# Patient Record
Sex: FEMALE | Race: WHITE
[De-identification: names, ages, dates, MRNs, and addresses within clinical notes are randomized per-mention and may not be internally consistent; named-entity substitution may affect disease eponyms.]

---

## 2018-08-27 ENCOUNTER — RX RENEWAL (OUTPATIENT)
Age: 58
End: 2018-08-27

## 2018-09-29 ENCOUNTER — RECORD ABSTRACTING (OUTPATIENT)
Age: 58
End: 2018-09-29

## 2018-09-29 DIAGNOSIS — Z80.3 FAMILY HISTORY OF MALIGNANT NEOPLASM OF BREAST: ICD-10-CM

## 2018-09-29 DIAGNOSIS — Z82.49 FAMILY HISTORY OF ISCHEMIC HEART DISEASE AND OTHER DISEASES OF THE CIRCULATORY SYSTEM: ICD-10-CM

## 2018-11-14 ENCOUNTER — NON-APPOINTMENT (OUTPATIENT)
Age: 58
End: 2018-11-14

## 2018-11-14 ENCOUNTER — LABORATORY RESULT (OUTPATIENT)
Age: 58
End: 2018-11-14

## 2018-11-14 ENCOUNTER — APPOINTMENT (OUTPATIENT)
Dept: PULMONOLOGY | Facility: CLINIC | Age: 58
End: 2018-11-14
Payer: COMMERCIAL

## 2018-11-14 VITALS
DIASTOLIC BLOOD PRESSURE: 75 MMHG | SYSTOLIC BLOOD PRESSURE: 112 MMHG | OXYGEN SATURATION: 99 % | BODY MASS INDEX: 22.58 KG/M2 | HEART RATE: 64 BPM | RESPIRATION RATE: 18 BRPM | HEIGHT: 60 IN | TEMPERATURE: 97.7 F | WEIGHT: 115 LBS

## 2018-11-14 DIAGNOSIS — M89.9 DISORDER OF BONE, UNSPECIFIED: ICD-10-CM

## 2018-11-14 LAB
ALBUMIN: 10
BILIRUB UR QL STRIP: NEGATIVE
CLARITY UR: CLEAR
COLLECTION METHOD: NORMAL
CREATININE: 50
GLUCOSE UR-MCNC: NEGATIVE
HCG UR QL: 0.2 EU/DL
HGB UR QL STRIP.AUTO: NORMAL
KETONES UR-MCNC: NEGATIVE
LEUKOCYTE ESTERASE UR QL STRIP: NEGATIVE
MICROALBUMIN/CREAT UR TEST STR-RTO: <30
NITRITE UR QL STRIP: NEGATIVE
PH UR STRIP: 5.5
PROT UR STRIP-MCNC: NEGATIVE
SP GR UR STRIP: <=1.005

## 2018-11-14 PROCEDURE — 71046 X-RAY EXAM CHEST 2 VIEWS: CPT

## 2018-11-14 PROCEDURE — 82044 UR ALBUMIN SEMIQUANTITATIVE: CPT | Mod: QW

## 2018-11-14 PROCEDURE — 99396 PREV VISIT EST AGE 40-64: CPT | Mod: 25

## 2018-11-14 PROCEDURE — 77080 DXA BONE DENSITY AXIAL: CPT

## 2018-11-14 PROCEDURE — 81003 URINALYSIS AUTO W/O SCOPE: CPT | Mod: QW

## 2018-11-14 PROCEDURE — 36415 COLL VENOUS BLD VENIPUNCTURE: CPT

## 2018-11-14 PROCEDURE — 93000 ELECTROCARDIOGRAM COMPLETE: CPT

## 2018-11-15 LAB
25(OH)D3 SERPL-MCNC: 17.3 NG/ML
ALBUMIN SERPL ELPH-MCNC: 5 G/DL
ALP BLD-CCNC: 49 U/L
ALT SERPL-CCNC: 14 U/L
ANION GAP SERPL CALC-SCNC: 11 MMOL/L
AST SERPL-CCNC: 10 U/L
BASOPHILS # BLD AUTO: 0.02 K/UL
BASOPHILS NFR BLD AUTO: 0.4 %
BILIRUB SERPL-MCNC: 0.6 MG/DL
BUN SERPL-MCNC: 12 MG/DL
CALCIUM SERPL-MCNC: 9.8 MG/DL
CHLORIDE SERPL-SCNC: 103 MMOL/L
CHOLEST SERPL-MCNC: 204 MG/DL
CHOLEST/HDLC SERPL: 2.8 RATIO
CO2 SERPL-SCNC: 28 MMOL/L
CREAT SERPL-MCNC: 0.65 MG/DL
EOSINOPHIL # BLD AUTO: 0.12 K/UL
EOSINOPHIL NFR BLD AUTO: 2.4 %
GLUCOSE SERPL-MCNC: 97 MG/DL
HBA1C MFR BLD HPLC: 5.4 %
HCT VFR BLD CALC: 41.2 %
HCV AB SER QL: NONREACTIVE
HCV S/CO RATIO: 0.2 S/CO
HDLC SERPL-MCNC: 72 MG/DL
HGB BLD-MCNC: 13.2 G/DL
IMM GRANULOCYTES NFR BLD AUTO: 0 %
LDLC SERPL CALC-MCNC: 118 MG/DL
LYMPHOCYTES # BLD AUTO: 1.39 K/UL
LYMPHOCYTES NFR BLD AUTO: 27.5 %
MAN DIFF?: NORMAL
MCHC RBC-ENTMCNC: 31 PG
MCHC RBC-ENTMCNC: 32 GM/DL
MCV RBC AUTO: 96.7 FL
MONOCYTES # BLD AUTO: 0.33 K/UL
MONOCYTES NFR BLD AUTO: 6.5 %
NEUTROPHILS # BLD AUTO: 3.19 K/UL
NEUTROPHILS NFR BLD AUTO: 63.2 %
PLATELET # BLD AUTO: 253 K/UL
POTASSIUM SERPL-SCNC: 4.9 MMOL/L
PROT SERPL-MCNC: 7.3 G/DL
RBC # BLD: 4.26 M/UL
RBC # FLD: 13.1 %
SODIUM SERPL-SCNC: 142 MMOL/L
T3 SERPL-MCNC: 86 NG/DL
T3RU NFR SERPL: 1.01 INDEX
T4 FREE SERPL-MCNC: 1.6 NG/DL
T4 SERPL-MCNC: 8 UG/DL
TRIGL SERPL-MCNC: 72 MG/DL
TSH SERPL-ACNC: 0.34 UIU/ML
WBC # FLD AUTO: 5.05 K/UL

## 2018-11-22 ENCOUNTER — RX RENEWAL (OUTPATIENT)
Age: 58
End: 2018-11-22

## 2019-08-06 ENCOUNTER — APPOINTMENT (OUTPATIENT)
Dept: GASTROENTEROLOGY | Facility: CLINIC | Age: 59
End: 2019-08-06
Payer: COMMERCIAL

## 2019-08-06 VITALS
HEIGHT: 60 IN | BODY MASS INDEX: 22.38 KG/M2 | HEART RATE: 68 BPM | TEMPERATURE: 98.7 F | DIASTOLIC BLOOD PRESSURE: 70 MMHG | SYSTOLIC BLOOD PRESSURE: 110 MMHG | WEIGHT: 114 LBS | OXYGEN SATURATION: 99 %

## 2019-08-06 DIAGNOSIS — Z86.39 PERSONAL HISTORY OF OTHER ENDOCRINE, NUTRITIONAL AND METABOLIC DISEASE: ICD-10-CM

## 2019-08-06 DIAGNOSIS — Z84.89 FAMILY HISTORY OF OTHER SPECIFIED CONDITIONS: ICD-10-CM

## 2019-08-06 DIAGNOSIS — Z78.9 OTHER SPECIFIED HEALTH STATUS: ICD-10-CM

## 2019-08-06 PROCEDURE — 99243 OFF/OP CNSLTJ NEW/EST LOW 30: CPT

## 2019-08-06 NOTE — HISTORY OF PRESENT ILLNESS
[Heartburn] : denies heartburn [Nausea] : denies nausea [Vomiting] : denies vomiting [Diarrhea] : denies diarrhea [Constipation] : denies constipation [Yellow Skin Or Eyes (Jaundice)] : denies jaundice [Abdominal Pain] : denies abdominal pain [Abdominal Swelling] : denies abdominal swelling [Rectal Pain] : denies rectal pain [_________] : Performed [unfilled] [Wt Gain ___ Lbs] : no recent weight gain [Wt Loss ___ Lbs] : no recent weight loss [GERD] : no gastroesophageal reflux disease [Hiatus Hernia] : no hiatus hernia [Peptic Ulcer Disease] : no peptic ulcer disease [Pancreatitis] : no pancreatitis [Cholelithiasis] : no cholelithiasis [Kidney Stone] : no kidney stone [Inflammatory Bowel Disease] : no inflammatory bowel disease [Irritable Bowel Syndrome] : no irritable bowel syndrome [Diverticulitis] : no diverticulitis [Alcohol Abuse] : no alcohol abuse [Malignancy] : no malignancy [Abdominal Surgery] : no abdominal surgery [Appendectomy] : no appendectomy [Cholecystectomy] : no cholecystectomy [de-identified] : 59 year old woman presents for a screening colonoscopy. Her last colonoscopy on 10/1/13 was normal. She denies rectal bleeding, melena or hematemesis. She is on thyroid medication. She ha No family history  of colon cancer or polyps. [de-identified] : negative

## 2019-08-06 NOTE — CONSULT LETTER
[Dear  ___] : Dear  [unfilled], [Consult Letter:] : I had the pleasure of evaluating your patient, [unfilled]. [Please see my note below.] : Please see my note below. [Consult Closing:] : Thank you very much for allowing me to participate in the care of this patient.  If you have any questions, please do not hesitate to contact me. [Sincerely,] : Sincerely, [FreeTextEntry3] : Luís Bray MD, FACG\par

## 2019-08-06 NOTE — PHYSICAL EXAM
[General Appearance - Alert] : alert [General Appearance - In No Acute Distress] : in no acute distress [Sclera] : the sclera and conjunctiva were normal [PERRL With Normal Accommodation] : pupils were equal in size, round, and reactive to light [Extraocular Movements] : extraocular movements were intact [Outer Ear] : the ears and nose were normal in appearance [Oropharynx] : the oropharynx was normal [Neck Cervical Mass (___cm)] : no neck mass was observed [Neck Appearance] : the appearance of the neck was normal [Jugular Venous Distention Increased] : there was no jugular-venous distention [Thyroid Diffuse Enlargement] : the thyroid was not enlarged [Thyroid Nodule] : there were no palpable thyroid nodules [Auscultation Breath Sounds / Voice Sounds] : lungs were clear to auscultation bilaterally [Heart Rate And Rhythm] : heart rate was normal and rhythm regular [Heart Sounds] : normal S1 and S2 [Heart Sounds Gallop] : no gallops [Murmurs] : no murmurs [Heart Sounds Pericardial Friction Rub] : no pericardial rub [Bowel Sounds] : normal bowel sounds [Abdomen Tenderness] : non-tender [Abdomen Soft] : soft [] : no hepato-splenomegaly [Abdomen Mass (___ Cm)] : no abdominal mass palpated [Cervical Lymph Nodes Enlarged Posterior Bilaterally] : posterior cervical [Cervical Lymph Nodes Enlarged Anterior Bilaterally] : anterior cervical [Supraclavicular Lymph Nodes Enlarged Bilaterally] : supraclavicular [No CVA Tenderness] : no ~M costovertebral angle tenderness [No Spinal Tenderness] : no spinal tenderness [Abnormal Walk] : normal gait [Nail Clubbing] : no clubbing  or cyanosis of the fingernails [Musculoskeletal - Swelling] : no joint swelling seen [Motor Tone] : muscle strength and tone were normal [Sensation] : the sensory exam was normal to light touch and pinprick [Deep Tendon Reflexes (DTR)] : deep tendon reflexes were 2+ and symmetric [No Focal Deficits] : no focal deficits [Oriented To Time, Place, And Person] : oriented to person, place, and time [Impaired Insight] : insight and judgment were intact [Affect] : the affect was normal

## 2019-09-19 ENCOUNTER — RX RENEWAL (OUTPATIENT)
Age: 59
End: 2019-09-19

## 2019-11-06 ENCOUNTER — TRANSCRIPTION ENCOUNTER (OUTPATIENT)
Age: 59
End: 2019-11-06

## 2019-11-15 ENCOUNTER — APPOINTMENT (OUTPATIENT)
Dept: PULMONOLOGY | Facility: CLINIC | Age: 59
End: 2019-11-15

## 2020-02-03 ENCOUNTER — APPOINTMENT (OUTPATIENT)
Dept: PULMONOLOGY | Facility: CLINIC | Age: 60
End: 2020-02-03

## 2020-02-27 ENCOUNTER — TRANSCRIPTION ENCOUNTER (OUTPATIENT)
Age: 60
End: 2020-02-27

## 2020-02-27 ENCOUNTER — LABORATORY RESULT (OUTPATIENT)
Age: 60
End: 2020-02-27

## 2020-02-27 ENCOUNTER — NON-APPOINTMENT (OUTPATIENT)
Age: 60
End: 2020-02-27

## 2020-02-27 ENCOUNTER — APPOINTMENT (OUTPATIENT)
Dept: PULMONOLOGY | Facility: CLINIC | Age: 60
End: 2020-02-27
Payer: COMMERCIAL

## 2020-02-27 VITALS
SYSTOLIC BLOOD PRESSURE: 110 MMHG | HEIGHT: 60 IN | BODY MASS INDEX: 22.38 KG/M2 | DIASTOLIC BLOOD PRESSURE: 73 MMHG | WEIGHT: 114 LBS | HEART RATE: 70 BPM | OXYGEN SATURATION: 96 % | TEMPERATURE: 97.7 F

## 2020-02-27 DIAGNOSIS — M85.80 OTHER SPECIFIED DISORDERS OF BONE DENSITY AND STRUCTURE, UNSPECIFIED SITE: ICD-10-CM

## 2020-02-27 DIAGNOSIS — Z00.00 ENCOUNTER FOR GENERAL ADULT MEDICAL EXAMINATION W/OUT ABNORMAL FINDINGS: ICD-10-CM

## 2020-02-27 LAB
ALBUMIN: 10
BASOPHILS # BLD AUTO: 0.04 K/UL
BASOPHILS NFR BLD AUTO: 1.2 %
BILIRUB UR QL STRIP: NEGATIVE
CLARITY UR: CLEAR
COLLECTION METHOD: NORMAL
CREATININE: 10
EOSINOPHIL # BLD AUTO: 0.11 K/UL
EOSINOPHIL NFR BLD AUTO: 3.2 %
GLUCOSE UR-MCNC: NEGATIVE
HCG UR QL: 0.2 EU/DL
HCT VFR BLD CALC: 38.8 %
HGB BLD-MCNC: 13.2 G/DL
HGB UR QL STRIP.AUTO: NEGATIVE
IMM GRANULOCYTES NFR BLD AUTO: 0.3 %
KETONES UR-MCNC: NEGATIVE
LEUKOCYTE ESTERASE UR QL STRIP: NORMAL
LYMPHOCYTES # BLD AUTO: 1.17 K/UL
LYMPHOCYTES NFR BLD AUTO: 34.3 %
MAN DIFF?: NORMAL
MCHC RBC-ENTMCNC: 32.8 PG
MCHC RBC-ENTMCNC: 34 GM/DL
MCV RBC AUTO: 96.3 FL
MICROALBUMIN/CREAT UR TEST STR-RTO: <30
MONOCYTES # BLD AUTO: 0.29 K/UL
MONOCYTES NFR BLD AUTO: 8.5 %
NEUTROPHILS # BLD AUTO: 1.79 K/UL
NEUTROPHILS NFR BLD AUTO: 52.5 %
NITRITE UR QL STRIP: NEGATIVE
PH UR STRIP: 5.5
PLATELET # BLD AUTO: 245 K/UL
PROT UR STRIP-MCNC: NEGATIVE
RBC # BLD: 4.03 M/UL
RBC # FLD: 11.9 %
SP GR UR STRIP: 1.01
WBC # FLD AUTO: 3.41 K/UL

## 2020-02-27 PROCEDURE — 36415 COLL VENOUS BLD VENIPUNCTURE: CPT

## 2020-02-27 PROCEDURE — 82044 UR ALBUMIN SEMIQUANTITATIVE: CPT | Mod: QW

## 2020-02-27 PROCEDURE — 93000 ELECTROCARDIOGRAM COMPLETE: CPT

## 2020-02-27 PROCEDURE — 99396 PREV VISIT EST AGE 40-64: CPT | Mod: 25

## 2020-02-27 PROCEDURE — 81003 URINALYSIS AUTO W/O SCOPE: CPT | Mod: QW

## 2020-02-28 LAB
25(OH)D3 SERPL-MCNC: 12.4 NG/ML
ALBUMIN SERPL ELPH-MCNC: 5 G/DL
ALP BLD-CCNC: 47 U/L
ALT SERPL-CCNC: 11 U/L
ANION GAP SERPL CALC-SCNC: 15 MMOL/L
AST SERPL-CCNC: 12 U/L
BILIRUB SERPL-MCNC: 0.7 MG/DL
BUN SERPL-MCNC: 12 MG/DL
CALCIUM SERPL-MCNC: 9.7 MG/DL
CHLORIDE SERPL-SCNC: 101 MMOL/L
CHOLEST SERPL-MCNC: 185 MG/DL
CHOLEST/HDLC SERPL: 2.6 RATIO
CO2 SERPL-SCNC: 23 MMOL/L
CREAT SERPL-MCNC: 0.72 MG/DL
ESTIMATED AVERAGE GLUCOSE: 105 MG/DL
GLUCOSE SERPL-MCNC: 83 MG/DL
HBA1C MFR BLD HPLC: 5.3 %
HCV AB SER QL: NONREACTIVE
HCV S/CO RATIO: 0.26 S/CO
HDLC SERPL-MCNC: 70 MG/DL
LDLC SERPL CALC-MCNC: 105 MG/DL
POTASSIUM SERPL-SCNC: 4.3 MMOL/L
PROT SERPL-MCNC: 7.1 G/DL
SODIUM SERPL-SCNC: 139 MMOL/L
T4 FREE SERPL-MCNC: 1.9 NG/DL
T4 SERPL-MCNC: 9.4 UG/DL
TRIGL SERPL-MCNC: 47 MG/DL
TSH SERPL-ACNC: 0.28 UIU/ML

## 2020-02-28 RX ORDER — ERGOCALCIFEROL 1.25 MG/1
1.25 MG CAPSULE, LIQUID FILLED ORAL
Qty: 8 | Refills: 1 | Status: ACTIVE | COMMUNITY
Start: 2020-02-28 | End: 1900-01-01

## 2020-02-28 NOTE — PROCEDURE
[FreeTextEntry1] : UA  noted\par  Blood Draw\par \par EKG 2/27/2020\par NSR\par \par Chest x-ray PA lateral projection November 14, 2018\par Mild upper lobe scar noted\par Cardiac size normal\par No pleural effusions parenchymal infiltrates dominant pulmonary nodules\par Mediastinum hilum normal\par \par DEXA Osteopenia f/u Nov 2020\par \par Data review February 28, 2020\par Hemoglobin A1c 5.3%\par Hepatitis C titer negative\par Lipid profile cholesterol 185 HDL 70 \par Triglycerides 47\par TSH 0.28\par Free T4 1.9\par Vitamin D 12.4\par Serum electrolytes normal\par Liver function testing normal\par CBC White blood count 3.41 hemoglobin 13.2 hematocrit 38.8\par Neutrophil 1.79\par Platelet count 245,000\par

## 2020-02-28 NOTE — ADDENDUM
[FreeTextEntry1] : LAB review\par CBC within normal limits\par Thyroid function test WNL\par HCV neg\par Electrolytes sodium potassium serum calcium\par Normal\par Glucose normal liver function tests normal\par Vit 17.3 advised to take vitamin D 2000 units daily HGBA1c WNL\par Cholesterol 204 HDL 72  triglycerides 72 ratio 2.8

## 2020-02-28 NOTE — HISTORY OF PRESENT ILLNESS
[Improved] : have improved [None] : The patient is not currently on any medications [FreeTextEntry1] : s/p GYN\par Flu shot up to date\par Colonoscopy Oct 2013 last GI Aug visit 2019 Osteopenia\par Mammogram Jan 2019 with GYN

## 2020-02-28 NOTE — PHYSICAL EXAM
[General Appearance - Well Developed] : well developed [Normal Appearance] : normal appearance [Well Groomed] : well groomed [General Appearance - Well Nourished] : well nourished [No Deformities] : no deformities [General Appearance - In No Acute Distress] : no acute distress [Normal Conjunctiva] : the conjunctiva exhibited no abnormalities [Eyelids - No Xanthelasma] : the eyelids demonstrated no xanthelasmas [Normal Oropharynx] : normal oropharynx [I] : I [Neck Appearance] : the appearance of the neck was normal [Neck Cervical Mass (___cm)] : no neck mass was observed [Jugular Venous Distention Increased] : there was no jugular-venous distention [Thyroid Diffuse Enlargement] : the thyroid was not enlarged [Thyroid Nodule] : there were no palpable thyroid nodules [Heart Rate And Rhythm] : heart rate and rhythm were normal [Heart Sounds] : normal S1 and S2 [Murmurs] : no murmurs present [Arterial Pulses Normal] : the arterial pulses were normal [Edema] : no peripheral edema present [Veins - Varicosity Changes] : no varicosital changes were noted in the lower extremities [Respiration, Rhythm And Depth] : normal respiratory rhythm and effort [Exaggerated Use Of Accessory Muscles For Inspiration] : no accessory muscle use [Auscultation Breath Sounds / Voice Sounds] : lungs were clear to auscultation bilaterally [Chest Palpation] : palpation of the chest revealed no abnormalities [Lungs Percussion] : the lungs were normal to percussion [Bowel Sounds] : normal bowel sounds [Abdomen Tenderness] : non-tender [Abdomen Soft] : soft [Abdomen Mass (___ Cm)] : no abdominal mass palpated [Abnormal Walk] : normal gait [Gait - Sufficient For Exercise Testing] : the gait was sufficient for exercise testing [Nail Clubbing] : no clubbing of the fingernails [Cyanosis, Localized] : no localized cyanosis [Petechial Hemorrhages (___cm)] : no petechial hemorrhages [Skin Color & Pigmentation] : normal skin color and pigmentation [] : no rash [No Venous Stasis] : no venous stasis [Skin Lesions] : no skin lesions [No Skin Ulcers] : no skin ulcer [No Xanthoma] : no  xanthoma was observed [Cranial Nerves] : cranial nerves 2-12 were intact [Sensation] : the sensory exam was normal to light touch and pinprick [Deep Tendon Reflexes (DTR)] : deep tendon reflexes were 2+ and symmetric [No Focal Deficits] : no focal deficits [Motor Exam] : the motor exam was normal [Oriented To Time, Place, And Person] : oriented to person, place, and time [Impaired Insight] : insight and judgment were intact [Affect] : the affect was normal [Mood] : the mood was normal [FreeTextEntry1] : No adenopathy

## 2020-02-28 NOTE — DISCUSSION/SUMMARY
[FreeTextEntry1] : Well Visit\par Blood work review\par Vit D  insufficiency\par  Osteopenia\par hypothyroidism-on Synthroid\par Borderline mild loctawkxxa-gpkcbw-fc CBC 1 month-subtle viral etiology\par  Complete labs \par weight bearing exercise\par  Ca++/ Vit d\par  As per GYN Mammogram Jan  f/u readvised\par  f/u GI timing colonoscopy post GI Aug 2019\par urine  culture \par Advised vitamin D supplementation with 50,000units 1 tablet weekly x8 with a refill\par Recheck CBC\par Office follow-up 3 months to repeat vitamin D

## 2020-02-29 DIAGNOSIS — Z87.440 PERSONAL HISTORY OF URINARY (TRACT) INFECTIONS: ICD-10-CM

## 2020-02-29 LAB — BACTERIA UR CULT: ABNORMAL

## 2020-03-11 ENCOUNTER — RX RENEWAL (OUTPATIENT)
Age: 60
End: 2020-03-11

## 2020-03-25 ENCOUNTER — TRANSCRIPTION ENCOUNTER (OUTPATIENT)
Age: 60
End: 2020-03-25

## 2020-05-26 ENCOUNTER — TRANSCRIPTION ENCOUNTER (OUTPATIENT)
Age: 60
End: 2020-05-26

## 2020-05-28 ENCOUNTER — APPOINTMENT (OUTPATIENT)
Dept: PULMONOLOGY | Facility: CLINIC | Age: 60
End: 2020-05-28
Payer: COMMERCIAL

## 2020-05-28 VITALS
OXYGEN SATURATION: 100 % | SYSTOLIC BLOOD PRESSURE: 129 MMHG | TEMPERATURE: 97.7 F | HEART RATE: 78 BPM | DIASTOLIC BLOOD PRESSURE: 76 MMHG

## 2020-05-28 LAB
BILIRUB UR QL STRIP: NEGATIVE
CLARITY UR: CLEAR
COLLECTION METHOD: NORMAL
GLUCOSE UR-MCNC: NEGATIVE
HCG UR QL: 0.2 EU/DL
HGB UR QL STRIP.AUTO: NEGATIVE
KETONES UR-MCNC: NEGATIVE
LEUKOCYTE ESTERASE UR QL STRIP: NORMAL
NITRITE UR QL STRIP: NEGATIVE
PH UR STRIP: 6.5
PROT UR STRIP-MCNC: NEGATIVE
SP GR UR STRIP: 1.01

## 2020-05-28 PROCEDURE — 99214 OFFICE O/P EST MOD 30 MIN: CPT | Mod: 25

## 2020-05-28 PROCEDURE — 81003 URINALYSIS AUTO W/O SCOPE: CPT | Mod: QW

## 2020-05-28 PROCEDURE — 36415 COLL VENOUS BLD VENIPUNCTURE: CPT

## 2020-05-28 RX ORDER — CEFACLOR 500 MG/1
500 CAPSULE ORAL
Qty: 10 | Refills: 0 | Status: DISCONTINUED | COMMUNITY
Start: 2020-02-29 | End: 2020-05-28

## 2020-05-29 LAB
25(OH)D3 SERPL-MCNC: 34.2 NG/ML
BASOPHILS # BLD AUTO: 0.03 K/UL
BASOPHILS NFR BLD AUTO: 0.5 %
EOSINOPHIL # BLD AUTO: 0.08 K/UL
EOSINOPHIL NFR BLD AUTO: 1.4 %
HCT VFR BLD CALC: 42 %
HGB BLD-MCNC: 13.5 G/DL
IMM GRANULOCYTES NFR BLD AUTO: 0.3 %
LYMPHOCYTES # BLD AUTO: 1.67 K/UL
LYMPHOCYTES NFR BLD AUTO: 29.2 %
MAN DIFF?: NORMAL
MCHC RBC-ENTMCNC: 31.7 PG
MCHC RBC-ENTMCNC: 32.1 GM/DL
MCV RBC AUTO: 98.6 FL
MONOCYTES # BLD AUTO: 0.39 K/UL
MONOCYTES NFR BLD AUTO: 6.8 %
NEUTROPHILS # BLD AUTO: 3.53 K/UL
NEUTROPHILS NFR BLD AUTO: 61.8 %
PLATELET # BLD AUTO: 262 K/UL
RBC # BLD: 4.26 M/UL
RBC # FLD: 12.4 %
T4 FREE SERPL-MCNC: 1.8 NG/DL
T4 SERPL-MCNC: 9.4 UG/DL
TSH SERPL-ACNC: 0.22 UIU/ML
WBC # FLD AUTO: 5.72 K/UL

## 2020-05-30 LAB
SARS-COV-2 IGG SERPL IA-ACNC: 0.2 INDEX
SARS-COV-2 IGG SERPL QL IA: NEGATIVE

## 2020-05-30 NOTE — PHYSICAL EXAM
[Normal Appearance] : normal appearance [General Appearance - Well Developed] : well developed [No Deformities] : no deformities [General Appearance - Well Nourished] : well nourished [Well Groomed] : well groomed [Normal Conjunctiva] : the conjunctiva exhibited no abnormalities [Eyelids - No Xanthelasma] : the eyelids demonstrated no xanthelasmas [General Appearance - In No Acute Distress] : no acute distress [Normal Oropharynx] : normal oropharynx [I] : I [Neck Appearance] : the appearance of the neck was normal [Jugular Venous Distention Increased] : there was no jugular-venous distention [Neck Cervical Mass (___cm)] : no neck mass was observed [Thyroid Diffuse Enlargement] : the thyroid was not enlarged [Thyroid Nodule] : there were no palpable thyroid nodules [Heart Rate And Rhythm] : heart rate and rhythm were normal [Murmurs] : no murmurs present [Arterial Pulses Normal] : the arterial pulses were normal [Heart Sounds] : normal S1 and S2 [Veins - Varicosity Changes] : no varicosital changes were noted in the lower extremities [Edema] : no peripheral edema present [Auscultation Breath Sounds / Voice Sounds] : lungs were clear to auscultation bilaterally [Exaggerated Use Of Accessory Muscles For Inspiration] : no accessory muscle use [Respiration, Rhythm And Depth] : normal respiratory rhythm and effort [Lungs Percussion] : the lungs were normal to percussion [Chest Palpation] : palpation of the chest revealed no abnormalities [Abdomen Soft] : soft [Abdomen Tenderness] : non-tender [Bowel Sounds] : normal bowel sounds [Abdomen Mass (___ Cm)] : no abdominal mass palpated [Gait - Sufficient For Exercise Testing] : the gait was sufficient for exercise testing [Abnormal Walk] : normal gait [Cyanosis, Localized] : no localized cyanosis [Nail Clubbing] : no clubbing of the fingernails [Petechial Hemorrhages (___cm)] : no petechial hemorrhages [Skin Color & Pigmentation] : normal skin color and pigmentation [] : no rash [No Venous Stasis] : no venous stasis [Skin Lesions] : no skin lesions [No Skin Ulcers] : no skin ulcer [Cranial Nerves] : cranial nerves 2-12 were intact [No Xanthoma] : no  xanthoma was observed [Sensation] : the sensory exam was normal to light touch and pinprick [Deep Tendon Reflexes (DTR)] : deep tendon reflexes were 2+ and symmetric [No Focal Deficits] : no focal deficits [Motor Exam] : the motor exam was normal [Impaired Insight] : insight and judgment were intact [Oriented To Time, Place, And Person] : oriented to person, place, and time [Affect] : the affect was normal [Mood] : the mood was normal [FreeTextEntry1] : non icteric

## 2020-05-30 NOTE — PROCEDURE
[FreeTextEntry1] : UA  noted-small leukocytes without nitrates-await culture\par  Blood Draw\par \par EKG 2/27/2020\par NSR\par \par Chest x-ray PA lateral projection November 14, 2018\par Mild upper lobe scar noted\par Cardiac size normal\par No pleural effusions parenchymal infiltrates dominant pulmonary nodules\par Mediastinum hilum normal\par \par DEXA Osteopenia f/u Nov 2020\par \par Data review May 29 2020\par CBC normal\par White blood count normalized 5.72 hemoglobin 13.5 hematocrit 42 platelets 262,000\par Vitamin D 34.2\par \par Data review February 28, 2020\par Hemoglobin A1c 5.3%\par Hepatitis C titer negative\par Lipid profile cholesterol 185 HDL 70 \par Triglycerides 47\par TSH 0.28\par Free T4 1.9\par Vitamin D 12.4\par Serum electrolytes normal\par Liver function testing normal\par CBC White blood count 3.41 hemoglobin 13.2 hematocrit 38.8\par Neutrophil 1.79\par Platelet count 245,000\par

## 2020-05-30 NOTE — DISCUSSION/SUMMARY
[FreeTextEntry1] : COVID ab Negative\par Vit D  insufficiency\par  Osteopenia\par hypothyroidism-on Synthroid- Change to 6 days per week and recheck next  visit\par Borderline mild iyuppyllaj-iubgeo-ej CBC 1 month-subtle viral etiology\par  Complete labs \par weight bearing exercise\par  Ca++/ Vit d\par  As per GYN Mammogram Jan  f/u readvised\par  f/u GI timing colonoscopy post GI Aug 2019\par urine  culture f/u\par Advised vitamin D supplementation with 50,000units 1 tablet weekly x8 with a refill\par Recheck CBC\par Nov 2020 DEXA

## 2020-05-30 NOTE — HISTORY OF PRESENT ILLNESS
[Improved] : have improved [None] : The patient is not currently on any medications [FreeTextEntry1] : s/p GYN\par Colonoscopy Oct 2013 last GI Aug visit 2019\par  Osteopenia\par Mammogram Jan 2019 with GYN per patient all up  to  date\par Returns to the office for follow-up to recheck CBC vitamin D thyroid function testing and urine analysis with culture\par Prior urine analysis with culture February 27, 2020 strep group B

## 2020-06-01 LAB — BACTERIA UR CULT: ABNORMAL

## 2020-09-09 ENCOUNTER — RX RENEWAL (OUTPATIENT)
Age: 60
End: 2020-09-09

## 2020-09-21 ENCOUNTER — TRANSCRIPTION ENCOUNTER (OUTPATIENT)
Age: 60
End: 2020-09-21

## 2020-09-22 ENCOUNTER — TRANSCRIPTION ENCOUNTER (OUTPATIENT)
Age: 60
End: 2020-09-22

## 2020-12-23 PROBLEM — Z87.440 HISTORY OF URINARY TRACT INFECTION: Status: RESOLVED | Noted: 2020-02-29 | Resolved: 2020-12-23

## 2021-02-10 ENCOUNTER — TRANSCRIPTION ENCOUNTER (OUTPATIENT)
Age: 61
End: 2021-02-10

## 2021-03-12 ENCOUNTER — APPOINTMENT (OUTPATIENT)
Dept: PULMONOLOGY | Facility: CLINIC | Age: 61
End: 2021-03-12
Payer: COMMERCIAL

## 2021-03-12 ENCOUNTER — LABORATORY RESULT (OUTPATIENT)
Age: 61
End: 2021-03-12

## 2021-03-12 VITALS
TEMPERATURE: 98.2 F | DIASTOLIC BLOOD PRESSURE: 82 MMHG | SYSTOLIC BLOOD PRESSURE: 142 MMHG | OXYGEN SATURATION: 98 % | HEART RATE: 74 BPM

## 2021-03-12 VITALS — DIASTOLIC BLOOD PRESSURE: 78 MMHG | SYSTOLIC BLOOD PRESSURE: 125 MMHG

## 2021-03-12 DIAGNOSIS — R82.90 UNSPECIFIED ABNORMAL FINDINGS IN URINE: ICD-10-CM

## 2021-03-12 DIAGNOSIS — E55.9 VITAMIN D DEFICIENCY, UNSPECIFIED: ICD-10-CM

## 2021-03-12 PROCEDURE — 99072 ADDL SUPL MATRL&STAF TM PHE: CPT

## 2021-03-12 PROCEDURE — 99214 OFFICE O/P EST MOD 30 MIN: CPT | Mod: 25

## 2021-03-12 PROCEDURE — 36415 COLL VENOUS BLD VENIPUNCTURE: CPT

## 2021-03-12 NOTE — PHYSICAL EXAM
[General Appearance - Well Developed] : well developed [Normal Appearance] : normal appearance [Well Groomed] : well groomed [General Appearance - Well Nourished] : well nourished [No Deformities] : no deformities [General Appearance - In No Acute Distress] : no acute distress [Normal Conjunctiva] : the conjunctiva exhibited no abnormalities [Eyelids - No Xanthelasma] : the eyelids demonstrated no xanthelasmas [Normal Oropharynx] : normal oropharynx [I] : I [Neck Appearance] : the appearance of the neck was normal [Neck Cervical Mass (___cm)] : no neck mass was observed [Jugular Venous Distention Increased] : there was no jugular-venous distention [Thyroid Diffuse Enlargement] : the thyroid was not enlarged [Thyroid Nodule] : there were no palpable thyroid nodules [Heart Rate And Rhythm] : heart rate and rhythm were normal [Heart Sounds] : normal S1 and S2 [Murmurs] : no murmurs present [Arterial Pulses Normal] : the arterial pulses were normal [Edema] : no peripheral edema present [Veins - Varicosity Changes] : no varicosital changes were noted in the lower extremities [Respiration, Rhythm And Depth] : normal respiratory rhythm and effort [Exaggerated Use Of Accessory Muscles For Inspiration] : no accessory muscle use [Auscultation Breath Sounds / Voice Sounds] : lungs were clear to auscultation bilaterally [Chest Palpation] : palpation of the chest revealed no abnormalities [Lungs Percussion] : the lungs were normal to percussion [Bowel Sounds] : normal bowel sounds [Abdomen Soft] : soft [Abdomen Tenderness] : non-tender [Abdomen Mass (___ Cm)] : no abdominal mass palpated [Abnormal Walk] : normal gait [Gait - Sufficient For Exercise Testing] : the gait was sufficient for exercise testing [Nail Clubbing] : no clubbing of the fingernails [Cyanosis, Localized] : no localized cyanosis [Petechial Hemorrhages (___cm)] : no petechial hemorrhages [Skin Color & Pigmentation] : normal skin color and pigmentation [] : no rash [No Venous Stasis] : no venous stasis [Skin Lesions] : no skin lesions [No Skin Ulcers] : no skin ulcer [No Xanthoma] : no  xanthoma was observed [Cranial Nerves] : cranial nerves 2-12 were intact [Deep Tendon Reflexes (DTR)] : deep tendon reflexes were 2+ and symmetric [Sensation] : the sensory exam was normal to light touch and pinprick [Motor Exam] : the motor exam was normal [No Focal Deficits] : no focal deficits [Oriented To Time, Place, And Person] : oriented to person, place, and time [Impaired Insight] : insight and judgment were intact [Affect] : the affect was normal [Mood] : the mood was normal [FreeTextEntry1] : No adenopathy

## 2021-03-12 NOTE — DISCUSSION/SUMMARY
[FreeTextEntry1] : COVID ab Negative\par Vit D  insufficiency\par  Osteopenia\par hypothyroidism-on Synthroid- Change to 6 days per week and recheck next  visit\par Borderline mild hakyikyfof-hkoifm-in CBC 1 month-subtle viral etiology\par  Complete labs \par weight bearing exercise\par  Ca++/ Vit d\par  As per GYN Mammogram Jan  f/u readvised\par  f/u GI timing colonoscopy post GI Aug 2019\par urine  culture f/u pending\par Advised vitamin D supplementation with 50,000units 1 tablet weekly x8 with a refill\par Recheck CBC\par Nov 2020 DEXA- f/u  next  visit

## 2021-03-12 NOTE — HISTORY OF PRESENT ILLNESS
[Improved] : have improved [None] : The patient is not currently on any medications [FreeTextEntry1] : Hypothyroid\par   Urine pos Strep not txed\par \par s/p GYN\par Colonoscopy Oct 2013 last GI Aug visit 2019\par  Osteopenia\par Mammogram Jan 2019 with GYN per patient all up  to  date\par Returns to the office for follow-up to recheck CBC vitamin D thyroid function testing and urine analysis with culture\par Prior urine analysis with culture February 27, 2020 strep group B Patient Representative

## 2021-03-13 ENCOUNTER — NON-APPOINTMENT (OUTPATIENT)
Age: 61
End: 2021-03-13

## 2021-03-13 LAB
T3 SERPL-MCNC: 83 NG/DL
T4 SERPL-MCNC: 9 UG/DL
TSH SERPL-ACNC: 2.84 UIU/ML

## 2021-03-14 LAB — BACTERIA UR CULT: NORMAL

## 2021-03-15 ENCOUNTER — TRANSCRIPTION ENCOUNTER (OUTPATIENT)
Age: 61
End: 2021-03-15

## 2021-03-25 ENCOUNTER — RX RENEWAL (OUTPATIENT)
Age: 61
End: 2021-03-25

## 2021-06-16 ENCOUNTER — TRANSCRIPTION ENCOUNTER (OUTPATIENT)
Age: 61
End: 2021-06-16

## 2021-06-17 ENCOUNTER — TRANSCRIPTION ENCOUNTER (OUTPATIENT)
Age: 61
End: 2021-06-17

## 2021-06-21 ENCOUNTER — APPOINTMENT (OUTPATIENT)
Dept: PULMONOLOGY | Facility: CLINIC | Age: 61
End: 2021-06-21
Payer: COMMERCIAL

## 2021-06-21 ENCOUNTER — NON-APPOINTMENT (OUTPATIENT)
Age: 61
End: 2021-06-21

## 2021-06-21 VITALS
TEMPERATURE: 97.8 F | HEART RATE: 82 BPM | OXYGEN SATURATION: 99 % | SYSTOLIC BLOOD PRESSURE: 125 MMHG | DIASTOLIC BLOOD PRESSURE: 81 MMHG

## 2021-06-21 DIAGNOSIS — Z11.59 ENCOUNTER FOR SCREENING FOR OTHER VIRAL DISEASES: ICD-10-CM

## 2021-06-21 LAB
BASOPHILS # BLD AUTO: 0.04 K/UL
BASOPHILS NFR BLD AUTO: 0.7 %
EOSINOPHIL # BLD AUTO: 0.1 K/UL
EOSINOPHIL NFR BLD AUTO: 1.7 %
HCT VFR BLD CALC: 40.4 %
HGB BLD-MCNC: 12.7 G/DL
IMM GRANULOCYTES NFR BLD AUTO: 0.2 %
LYMPHOCYTES # BLD AUTO: 1.06 K/UL
LYMPHOCYTES NFR BLD AUTO: 18.4 %
MAN DIFF?: NORMAL
MCHC RBC-ENTMCNC: 31.4 GM/DL
MCHC RBC-ENTMCNC: 32 PG
MCV RBC AUTO: 101.8 FL
MONOCYTES # BLD AUTO: 0.52 K/UL
MONOCYTES NFR BLD AUTO: 9 %
NEUTROPHILS # BLD AUTO: 4.02 K/UL
NEUTROPHILS NFR BLD AUTO: 70 %
PLATELET # BLD AUTO: 240 K/UL
RBC # BLD: 3.97 M/UL
RBC # FLD: 12.4 %
WBC # FLD AUTO: 5.75 K/UL

## 2021-06-21 PROCEDURE — 99213 OFFICE O/P EST LOW 20 MIN: CPT | Mod: 25

## 2021-06-21 PROCEDURE — 99072 ADDL SUPL MATRL&STAF TM PHE: CPT

## 2021-06-21 PROCEDURE — 36415 COLL VENOUS BLD VENIPUNCTURE: CPT

## 2021-06-21 NOTE — HISTORY OF PRESENT ILLNESS
[Improved] : have improved [None] : The patient is not currently on any medications [FreeTextEntry1] : sinus sxs\par  clear mucous\par   no  fever\par \par Hypothyroid\par \par s/p GYN\par Colonoscopy Oct 2013 last GI Aug visit 2019\par  Osteopenia\par Mammogram Jan 2019 with GYN per patient all up  to  date\par Returns to the office for follow-up to recheck CBC vitamin D thyroid function testing and urine analysis with culture\par Prior urine analysis with culture February 27, 2020 strep group B

## 2021-06-21 NOTE — PROCEDURE
[FreeTextEntry1] : \par  Blood Draw\par \par EKG 2/27/2020\par NSR\par \par Chest x-ray PA lateral projection November 14, 2018\par Mild upper lobe scar noted\par Cardiac size normal\par No pleural effusions parenchymal infiltrates dominant pulmonary nodules\par Mediastinum hilum normal\par \par DEXA Osteopenia f/u Nov 2020\par \par Data review May 29 2020\par CBC normal\par White blood count normalized 5.72 hemoglobin 13.5 hematocrit 42 platelets 262,000\par Vitamin D 34.2\par \par Data review February 28, 2020\par Hemoglobin A1c 5.3%\par Hepatitis C titer negative\par Lipid profile cholesterol 185 HDL 70 \par Triglycerides 47\par TSH 0.28\par Free T4 1.9\par Vitamin D 12.4\par Serum electrolytes normal\par Liver function testing normal\par CBC White blood count 3.41 hemoglobin 13.2 hematocrit 38.8\par Neutrophil 1.79\par Platelet count 245,000\par

## 2021-06-21 NOTE — DISCUSSION/SUMMARY
[FreeTextEntry1] : Sinusitis\par Rx flonase\par       Z APK\par COVID ab Negative\par Vit D  insufficiency\par  Osteopenia\par hypothyroidism-on Synthroid- Change to 6 days per week and recheck next  visit\par Borderline mild fgnfwentju-opbzkm-bc CBC 1 month-subtle viral etiology\par  Complete labs \par weight bearing exercise\par  Ca++/ Vit d\par  As per GYN Mammogram Jan  f/u readvised\par  f/u GI timing colonoscopy post GI Aug 2019\par urine  culture f/u pending\par Advised vitamin D supplementation with 50,000units 1 tablet weekly x8 with a refill\par Recheck CBC\par Nov 2020 DEXA- f/u  next  visit\par LAbs

## 2021-06-22 LAB
RAPID RVP RESULT: DETECTED
RV+EV RNA SPEC QL NAA+PROBE: DETECTED
SARS-COV-2 RNA PNL RESP NAA+PROBE: NOT DETECTED
TSH SERPL-ACNC: 3.43 UIU/ML

## 2021-08-16 ENCOUNTER — TRANSCRIPTION ENCOUNTER (OUTPATIENT)
Age: 61
End: 2021-08-16

## 2021-10-01 ENCOUNTER — APPOINTMENT (OUTPATIENT)
Dept: PULMONOLOGY | Facility: CLINIC | Age: 61
End: 2021-10-01

## 2021-10-18 ENCOUNTER — RX RENEWAL (OUTPATIENT)
Age: 61
End: 2021-10-18

## 2021-11-30 ENCOUNTER — TRANSCRIPTION ENCOUNTER (OUTPATIENT)
Age: 61
End: 2021-11-30

## 2021-12-17 DIAGNOSIS — Z23 ENCOUNTER FOR IMMUNIZATION: ICD-10-CM

## 2021-12-18 ENCOUNTER — TRANSCRIPTION ENCOUNTER (OUTPATIENT)
Age: 61
End: 2021-12-18

## 2021-12-20 ENCOUNTER — APPOINTMENT (OUTPATIENT)
Dept: PULMONOLOGY | Facility: CLINIC | Age: 61
End: 2021-12-20

## 2022-02-13 ENCOUNTER — TRANSCRIPTION ENCOUNTER (OUTPATIENT)
Age: 62
End: 2022-02-13

## 2022-02-24 ENCOUNTER — APPOINTMENT (OUTPATIENT)
Dept: PULMONOLOGY | Facility: CLINIC | Age: 62
End: 2022-02-24
Payer: COMMERCIAL

## 2022-02-24 ENCOUNTER — TRANSCRIPTION ENCOUNTER (OUTPATIENT)
Age: 62
End: 2022-02-24

## 2022-02-24 DIAGNOSIS — U07.1 COVID-19: ICD-10-CM

## 2022-02-24 PROCEDURE — 99213 OFFICE O/P EST LOW 20 MIN: CPT | Mod: 95

## 2022-02-24 NOTE — PROCEDURE
[FreeTextEntry1] : \par \par EKG 2/27/2020\par NSR\par \par Chest x-ray PA lateral projection November 14, 2018\par Mild upper lobe scar noted\par Cardiac size normal\par No pleural effusions parenchymal infiltrates dominant pulmonary nodules\par Mediastinum hilum normal\par \par DEXA Osteopenia f/u Nov 2020\par \par Data review May 29 2020\par CBC normal\par White blood count normalized 5.72 hemoglobin 13.5 hematocrit 42 platelets 262,000\par Vitamin D 34.2\par \par Data review February 28, 2020\par Hemoglobin A1c 5.3%\par Hepatitis C titer negative\par Lipid profile cholesterol 185 HDL 70 \par Triglycerides 47\par TSH 0.28\par Free T4 1.9\par Vitamin D 12.4\par Serum electrolytes normal\par Liver function testing normal\par CBC White blood count 3.41 hemoglobin 13.2 hematocrit 38.8\par Neutrophil 1.79\par Platelet count 245,000\par

## 2022-02-24 NOTE — REVIEW OF SYSTEMS
[Headache] : headache [Thyroid Problem] : thyroid problem [Negative] : Psychiatric [TextBox_144] : hypothyroid

## 2022-02-24 NOTE — PHYSICAL EXAM
[No Acute Distress] : no acute distress [No Resp Distress] : no resp distress [TextBox_68] : Able to talk in full sentences

## 2022-02-24 NOTE — HISTORY OF PRESENT ILLNESS
[Home] : at home, [unfilled] , at the time of the visit. [Medical Office: ([facility])___] : at the medical office located in  [Verbal consent obtained from patient] : the patient, [unfilled] [Never] : never [TextBox_4] : C/O pos HA\par exposure son with COVID 19\par no other sxs\par No URI sxs\par  COVID PFIZER x3 with last dose PFIZER

## 2022-02-24 NOTE — DISCUSSION/SUMMARY
[FreeTextEntry1] : COVID 19 Infection 2/24/22\par Vit D  insufficiency\par  Osteopenia\par hypothyroidism-on Synthroid- Change to 6 days per week and recheck next  visit\par Borderline mild wljxsmcxlf-byeisk-ix CBC 1 month-subtle viral etiology\par  Complete labs \par weight bearing exercise\par  Ca++/ Vit d\par  As per GYN Mammogram Oz  f/u readvised\par  f/u GI timing colonoscopy post GI Aug 2019\par urine  culture f/u pending\par Advised vitamin D supplementation with 50,000units 1 tablet weekly x8 with a refill\par Recheck CBC\par Nov 2020 DEXA- f/u  next  visit\par \par COVID BUNDLE STAT\par No strong indication for monoclonal antibody protocol\par Patient will check pulse oximetry to maintain O2 saturation greater than 93 otherwise seek immediate medical care and notify office\par Protocol aspirin 81 mg x 4 to 6 weeks\par Pepcid 40 mg times minimum 2 weeks\par Patient remains on vitamin D\par Covid track\par Notify if any deterioration in status\par Tylenol for headache

## 2022-02-25 ENCOUNTER — NON-APPOINTMENT (OUTPATIENT)
Age: 62
End: 2022-02-25

## 2022-02-26 ENCOUNTER — NON-APPOINTMENT (OUTPATIENT)
Age: 62
End: 2022-02-26

## 2022-02-26 LAB
ALBUMIN SERPL ELPH-MCNC: 4.8 G/DL
ALP BLD-CCNC: 51 U/L
ALT SERPL-CCNC: 14 U/L
ANION GAP SERPL CALC-SCNC: 15 MMOL/L
AST SERPL-CCNC: 14 U/L
BASOPHILS # BLD AUTO: 0.02 K/UL
BASOPHILS NFR BLD AUTO: 0.4 %
BILIRUB SERPL-MCNC: 0.3 MG/DL
BUN SERPL-MCNC: 9 MG/DL
CALCIUM SERPL-MCNC: 9.3 MG/DL
CHLORIDE SERPL-SCNC: 103 MMOL/L
CO2 SERPL-SCNC: 23 MMOL/L
CREAT SERPL-MCNC: 0.74 MG/DL
CRP SERPL-MCNC: 9 MG/L
DEPRECATED D DIMER PPP IA-ACNC: <150 NG/ML DDU
EOSINOPHIL # BLD AUTO: 0.05 K/UL
EOSINOPHIL NFR BLD AUTO: 1.1 %
FERRITIN SERPL-MCNC: 118 NG/ML
GLUCOSE SERPL-MCNC: 97 MG/DL
HCT VFR BLD CALC: 39 %
HGB BLD-MCNC: 12.6 G/DL
IMM GRANULOCYTES NFR BLD AUTO: 0.2 %
LYMPHOCYTES # BLD AUTO: 0.78 K/UL
LYMPHOCYTES NFR BLD AUTO: 16.6 %
MAN DIFF?: NORMAL
MCHC RBC-ENTMCNC: 31.4 PG
MCHC RBC-ENTMCNC: 32.3 GM/DL
MCV RBC AUTO: 97.3 FL
MONOCYTES # BLD AUTO: 0.48 K/UL
MONOCYTES NFR BLD AUTO: 10.2 %
NEUTROPHILS # BLD AUTO: 3.35 K/UL
NEUTROPHILS NFR BLD AUTO: 71.5 %
PLATELET # BLD AUTO: 247 K/UL
POTASSIUM SERPL-SCNC: 4.8 MMOL/L
PROCALCITONIN SERPL-MCNC: 0.05 NG/ML
PROT SERPL-MCNC: 7.3 G/DL
RBC # BLD: 4.01 M/UL
RBC # FLD: 12.2 %
SODIUM SERPL-SCNC: 141 MMOL/L
WBC # FLD AUTO: 4.69 K/UL

## 2022-04-11 PROBLEM — Z11.59 SCREENING FOR VIRAL DISEASE: Status: ACTIVE | Noted: 2020-05-28

## 2022-05-19 ENCOUNTER — TRANSCRIPTION ENCOUNTER (OUTPATIENT)
Age: 62
End: 2022-05-19

## 2022-05-19 ENCOUNTER — RX RENEWAL (OUTPATIENT)
Age: 62
End: 2022-05-19

## 2022-08-06 ENCOUNTER — EMERGENCY (EMERGENCY)
Facility: HOSPITAL | Age: 62
LOS: 1 days | Discharge: HOME | End: 2022-08-06
Attending: STUDENT IN AN ORGANIZED HEALTH CARE EDUCATION/TRAINING PROGRAM | Admitting: EMERGENCY MEDICINE
Payer: COMMERCIAL

## 2022-08-06 VITALS
WEIGHT: 104.94 LBS | HEIGHT: 60 IN | OXYGEN SATURATION: 100 % | HEART RATE: 53 BPM | DIASTOLIC BLOOD PRESSURE: 55 MMHG | TEMPERATURE: 97 F | RESPIRATION RATE: 17 BRPM | SYSTOLIC BLOOD PRESSURE: 92 MMHG

## 2022-08-06 VITALS
DIASTOLIC BLOOD PRESSURE: 62 MMHG | TEMPERATURE: 98 F | HEART RATE: 75 BPM | SYSTOLIC BLOOD PRESSURE: 109 MMHG | OXYGEN SATURATION: 100 % | RESPIRATION RATE: 16 BRPM

## 2022-08-06 LAB
ALBUMIN SERPL ELPH-MCNC: 3.9 G/DL — SIGNIFICANT CHANGE UP (ref 3.3–5)
ALP SERPL-CCNC: 48 U/L — SIGNIFICANT CHANGE UP (ref 40–120)
ALT FLD-CCNC: 18 U/L — SIGNIFICANT CHANGE UP (ref 10–45)
ANION GAP SERPL CALC-SCNC: 12 MMOL/L — SIGNIFICANT CHANGE UP (ref 5–17)
AST SERPL-CCNC: 19 U/L — SIGNIFICANT CHANGE UP (ref 10–40)
BASOPHILS # BLD AUTO: 0.05 K/UL — SIGNIFICANT CHANGE UP (ref 0–0.2)
BASOPHILS NFR BLD AUTO: 0.7 % — SIGNIFICANT CHANGE UP (ref 0–2)
BILIRUB SERPL-MCNC: 0.6 MG/DL — SIGNIFICANT CHANGE UP (ref 0.2–1.2)
BUN SERPL-MCNC: 10 MG/DL — SIGNIFICANT CHANGE UP (ref 7–23)
CALCIUM SERPL-MCNC: 8.3 MG/DL — LOW (ref 8.4–10.5)
CHLORIDE SERPL-SCNC: 106 MMOL/L — SIGNIFICANT CHANGE UP (ref 96–108)
CO2 SERPL-SCNC: 22 MMOL/L — SIGNIFICANT CHANGE UP (ref 22–31)
CREAT SERPL-MCNC: 0.7 MG/DL — SIGNIFICANT CHANGE UP (ref 0.5–1.3)
EGFR: 98 ML/MIN/1.73M2 — SIGNIFICANT CHANGE UP
EOSINOPHIL # BLD AUTO: 0.08 K/UL — SIGNIFICANT CHANGE UP (ref 0–0.5)
EOSINOPHIL NFR BLD AUTO: 1.2 % — SIGNIFICANT CHANGE UP (ref 0–6)
ETHANOL SERPL-MCNC: 162 MG/DL — HIGH (ref 0–3)
GLUCOSE BLDC GLUCOMTR-MCNC: 101 MG/DL — HIGH (ref 70–99)
GLUCOSE SERPL-MCNC: 90 MG/DL — SIGNIFICANT CHANGE UP (ref 70–99)
HCT VFR BLD CALC: 38.5 % — SIGNIFICANT CHANGE UP (ref 34.5–45)
HGB BLD-MCNC: 13 G/DL — SIGNIFICANT CHANGE UP (ref 11.5–15.5)
IMM GRANULOCYTES NFR BLD AUTO: 0.3 % — SIGNIFICANT CHANGE UP (ref 0–1.5)
LYMPHOCYTES # BLD AUTO: 1.5 K/UL — SIGNIFICANT CHANGE UP (ref 1–3.3)
LYMPHOCYTES # BLD AUTO: 22.4 % — SIGNIFICANT CHANGE UP (ref 13–44)
MCHC RBC-ENTMCNC: 32.7 PG — SIGNIFICANT CHANGE UP (ref 27–34)
MCHC RBC-ENTMCNC: 33.8 GM/DL — SIGNIFICANT CHANGE UP (ref 32–36)
MCV RBC AUTO: 96.7 FL — SIGNIFICANT CHANGE UP (ref 80–100)
MONOCYTES # BLD AUTO: 0.37 K/UL — SIGNIFICANT CHANGE UP (ref 0–0.9)
MONOCYTES NFR BLD AUTO: 5.5 % — SIGNIFICANT CHANGE UP (ref 2–14)
NEUTROPHILS # BLD AUTO: 4.67 K/UL — SIGNIFICANT CHANGE UP (ref 1.8–7.4)
NEUTROPHILS NFR BLD AUTO: 69.9 % — SIGNIFICANT CHANGE UP (ref 43–77)
NRBC # BLD: 0 /100 WBCS — SIGNIFICANT CHANGE UP (ref 0–0)
PLATELET # BLD AUTO: 221 K/UL — SIGNIFICANT CHANGE UP (ref 150–400)
POTASSIUM SERPL-MCNC: 3.2 MMOL/L — LOW (ref 3.5–5.3)
POTASSIUM SERPL-SCNC: 3.2 MMOL/L — LOW (ref 3.5–5.3)
PROT SERPL-MCNC: 7 G/DL — SIGNIFICANT CHANGE UP (ref 6–8.3)
RBC # BLD: 3.98 M/UL — SIGNIFICANT CHANGE UP (ref 3.8–5.2)
RBC # FLD: 11.9 % — SIGNIFICANT CHANGE UP (ref 10.3–14.5)
SODIUM SERPL-SCNC: 140 MMOL/L — SIGNIFICANT CHANGE UP (ref 135–145)
TROPONIN I, HIGH SENSITIVITY RESULT: 4.3 NG/L — SIGNIFICANT CHANGE UP
WBC # BLD: 6.69 K/UL — SIGNIFICANT CHANGE UP (ref 3.8–10.5)
WBC # FLD AUTO: 6.69 K/UL — SIGNIFICANT CHANGE UP (ref 3.8–10.5)

## 2022-08-06 PROCEDURE — 93005 ELECTROCARDIOGRAM TRACING: CPT

## 2022-08-06 PROCEDURE — 70450 CT HEAD/BRAIN W/O DYE: CPT | Mod: MA

## 2022-08-06 PROCEDURE — 85025 COMPLETE CBC W/AUTO DIFF WBC: CPT

## 2022-08-06 PROCEDURE — 99285 EMERGENCY DEPT VISIT HI MDM: CPT

## 2022-08-06 PROCEDURE — 71045 X-RAY EXAM CHEST 1 VIEW: CPT | Mod: 26

## 2022-08-06 PROCEDURE — 70498 CT ANGIOGRAPHY NECK: CPT | Mod: 26,MA

## 2022-08-06 PROCEDURE — 99285 EMERGENCY DEPT VISIT HI MDM: CPT | Mod: 25

## 2022-08-06 PROCEDURE — 93010 ELECTROCARDIOGRAM REPORT: CPT

## 2022-08-06 PROCEDURE — 80053 COMPREHEN METABOLIC PANEL: CPT

## 2022-08-06 PROCEDURE — 80307 DRUG TEST PRSMV CHEM ANLYZR: CPT

## 2022-08-06 PROCEDURE — 70496 CT ANGIOGRAPHY HEAD: CPT | Mod: 26,MA

## 2022-08-06 PROCEDURE — 0042T: CPT | Mod: MA

## 2022-08-06 PROCEDURE — 82962 GLUCOSE BLOOD TEST: CPT

## 2022-08-06 PROCEDURE — 36415 COLL VENOUS BLD VENIPUNCTURE: CPT

## 2022-08-06 PROCEDURE — 71045 X-RAY EXAM CHEST 1 VIEW: CPT

## 2022-08-06 PROCEDURE — 70498 CT ANGIOGRAPHY NECK: CPT | Mod: MA

## 2022-08-06 PROCEDURE — 84484 ASSAY OF TROPONIN QUANT: CPT

## 2022-08-06 PROCEDURE — 70496 CT ANGIOGRAPHY HEAD: CPT | Mod: MA

## 2022-08-06 PROCEDURE — 70450 CT HEAD/BRAIN W/O DYE: CPT | Mod: 26,59,MA

## 2022-08-06 RX ORDER — SODIUM CHLORIDE 9 MG/ML
1000 INJECTION INTRAMUSCULAR; INTRAVENOUS; SUBCUTANEOUS ONCE
Refills: 0 | Status: COMPLETED | OUTPATIENT
Start: 2022-08-06 | End: 2022-08-06

## 2022-08-06 RX ORDER — ONDANSETRON 8 MG/1
4 TABLET, FILM COATED ORAL ONCE
Refills: 0 | Status: COMPLETED | OUTPATIENT
Start: 2022-08-06 | End: 2022-08-06

## 2022-08-06 RX ADMIN — SODIUM CHLORIDE 2000 MILLILITER(S): 9 INJECTION INTRAMUSCULAR; INTRAVENOUS; SUBCUTANEOUS at 19:41

## 2022-08-06 NOTE — ED ADULT NURSE NOTE - OBJECTIVE STATEMENT
pt awake and alert BIB EMS from restaurant s/p syncopal episode. Per , pt drank 1 glass of mikael and ordered a second glass which she did not drink. Pt was in standing position and began to lose consciousness.  lowered pt to ground. No head strike. Pt presents to ED awake, diaphoretic and tearful. She is awake and alert to person and place. No facial droop., PERRL, UE and LE strong and equal bilaterally.

## 2022-08-06 NOTE — ED PROVIDER NOTE - ENMT, MLM
Airway patent, Nasal mucosa clear. Mouth with normal mucosa. Throat has no vesicles, no oropharyngeal exudates and uvula is midline. TMs normal b/l

## 2022-08-06 NOTE — ED PROVIDER NOTE - NS ED ATTENDING STATEMENT MOD
This was a shared visit with the AGUILA. I reviewed and verified the documentation and independently performed the documented:

## 2022-08-06 NOTE — ED ADULT NURSE REASSESSMENT NOTE - NS ED NURSE REASSESS COMMENT FT1
pt reassessed, pt is awake, alert and oriented no neurological deficit noted, pt denies any headache, dizziness, blurry vision, numbness / weakness on extremities. Pt d/divine by Md.

## 2022-08-06 NOTE — ED PROVIDER NOTE - OBJECTIVE STATEMENT
Brenda Garsia MD, Attending  Pt evaluated at 8:01PM, now with more information provided by family at bedside, states patient had acute onset unresponsive for a few seconds, but was confused, slurring speech, unable to swallow water, did not recognize friends at dinner, not following commands for >15 mintues. In the ambulance, pt was not at baseline, slurring her words and confused. In the ED, now at baseline, Aox3, per  at bedside. Patient in extended window. Now NIH stroke scale 0, no signs of LVO, Code stoke called at 8:01. 63 yo female with a medical history only of hypothyroidism,  witnessed by her  at approx. 5-5:30 pm to become suddenly unresponsive for approx. 5-10 seconds, then be confused, hallucinating and slurring words for approx. 10 minutes while at dinner, after reportedly sitting out in heat and drinking a glass of wine approx. 1 hr prior to incident.  NO falls or head impact, no chest pain, SOB, fevers, chills, headache, dizzines or any other complaint prior to incident. Patietn denies any complaint other than fatigue and thirst currently.     Brenda Garsia MD, Attending  Pt evaluated at 8:01PM, now with more information provided by family at bedside, states patient had acute onset unresponsive for a few seconds, but was confused, slurring speech, unable to swallow water, did not recognize friends at dinner, not following commands for >15 mintues. In the ambulance, pt was not at baseline, slurring her words and confused. In the ED, now at baseline, Aox3, per  at bedside. Patient in extended window. Now NIH stroke scale 0, no signs of LVO, Code stoke called at 8:01. 63 yo female with a medical history only of hypothyroidism,  witnessed by her  at approx. 5-5:30 pm to become suddenly unresponsive for approx. 5-10 seconds, after reportedly sitting out in heat and drinking a glass of wine approx. 1 hr prior to incident.  NO falls or head impact, no chest pain, SOB, fevers, chills, headache, dizzines or any other complaint prior to incident. Patietn denies any complaint other than fatigue and thirst currently.     Brenda Garsia MD, Attending  Pt evaluated by myself at 8:01PM, initially triaged as syncope with return to baseline. Now with more information provided by family at bedside, states patient had acute onset unresponsive for a few seconds, but had extended period of confusion, slurring speech, unable to swallow water, did not recognize friends at dinner, not following commands for >20 mintues. In the ambulance, pt was not at baseline, slurring her words and confused. In the ED, now at baseline, Aox3, per  at bedside. DAMIR 5pm, < 6 hours from present. Now NIH stroke scale 0, no signs of LVO, is not candidate for TPA. Code stroke called at 8:01 and RN notified.

## 2022-08-06 NOTE — ED PROVIDER NOTE - CLINICAL SUMMARY MEDICAL DECISION MAKING FREE TEXT BOX
61 yo female with a medical history only of hypothyroidism,  witnessed by her  at approx. 5-5:30 pm to become suddenly unresponsive for approx. 5-10 seconds, then be confused, hallucinating and slurring words for approx. 10 minutes while at dinner, after reportedly sitting out in heat and drinking a glass of wine approx. 1 hr prior to incident.  NO falls or head impact, no chest pain, SOB, fevers, chills, headache, dizzines or any other complaint prior to incident. Patietn denies any complaint other than fatigue and thirst currently.     Brenda Garsia MD, Attending  Pt evaluated at 8:01PM, now with more information provided by family at bedside, states patient had acute onset unresponsive for a few seconds, but was confused, slurring speech, unable to swallow water, did not recognize friends at dinner, not following commands for >15 mintues. In the ambulance, pt was not at baseline, slurring her words and confused. In the ED, now at baseline, Aox3, per  at bedside. Patient in extended window. Now NIH stroke scale 0, no signs of LVO, Code shannon called at 8:01.    Pt. A&O x3, neurovascularly intact, will do Head CT w /wo contrast,CBC, CMP, Trop, ECG, IVF,zofran, re-assess. Brenda Garsia MD, Attending  63 yo female with a medical history only of hypothyroidism,  witnessed by her  at approx. 5-5:30 pm to become suddenly unresponsive for approx. 5-10 seconds, followed by > 15 mins confused, slurring speech, unable to swallow water, did not recognize friends at dinner, not following commands for >15 mintues. In the ambulance, pt was not at baseline, slurring her words and confused. In the ED, now at baseline, Aox3, per  at bedside. Patient in extended window. Now NIH stroke scale 0, no signs of LVO, Code shannon called at 8:01. DDx: TIA, intox, dehydration, heat stroke, infection. DD stroke.TIA workup. reassess.   *The above represents an initial assessment/impression. Please refer to progress notes for potential changes in patient clinical course*

## 2022-08-06 NOTE — ED PROVIDER NOTE - PATIENT PORTAL LINK FT
You can access the FollowMyHealth Patient Portal offered by [facility] by registering at the following website: [email or web address]. By joining [facility]’s FollowMyHealth portal, you will also be able to view your health information using other applications (apps) compatible with our system.

## 2022-08-06 NOTE — ED PROVIDER NOTE - NEUROLOGICAL, MLM
Alert and oriented, no focal deficits, no motor or sensory deficits. CN2-12 intact, good strength throughout

## 2022-08-06 NOTE — ED ADULT NURSE NOTE - CHIEF COMPLAINT QUOTE
BIB EMS from the [facility] for syncopal episode due to possible dehydration. pt  reports pt had only 1 drink but was sitting in the sun for several hours.  reports + LOC, denies head trauma, states he caught her and lowered to chair. pt lethargic on arrival. Denies taking any blood thinners.

## 2022-08-06 NOTE — ED PROVIDER NOTE - PROGRESS NOTE DETAILS
Brenda Garsia MD, Attending  shared decision making, strong drink Brenda Garsia MD, Attending  results discussed with patient. Feeling at baseline. States she learned from friend that the one drink she had was per friend" Very very strong" Pt strongly prefers to go home, and will call PMD on Monday to arrange follow up appt early next week.

## 2022-08-06 NOTE — ED ADULT TRIAGE NOTE - CHIEF COMPLAINT QUOTE
BIB EMS from the [facility] for syncopal episode due to possible dehydration. pt  reports pt had only 1 drink but was sitting in the sun for several hours.  reports + LOC, denies head trauma, states he caught her and lowered to chair. pt lethargic on arrival. Denies taking any blood thinners.

## 2022-08-06 NOTE — ED PROVIDER NOTE - NSFOLLOWUPINSTRUCTIONS_ED_ALL_ED_FT
Your CT did not show an acute life-threatening medical problem.   Take over the counter Tylenol or Ibuprofen for pain -- follow dosing directions on original bottle.    Follow up with your primary care doctor in the next 72 hours.     ** Go to the nearest Emergency Department if you experience any new or concerning symptoms, such as:   - simpson in mental status  - chest pain  - difficulty breathing  - passing out  - unable to eat or drink  - unable to move or feel part of your body  - fever, chills

## 2022-08-06 NOTE — ED PROVIDER NOTE - ATTENDING APP SHARED VISIT CONTRIBUTION OF CARE
Dr. Garsia: I personally saw the patient with the ACP and performed a substantive portion of the visit. I performed a face to face bedside interview with patient regarding history of present illness, review of symptoms and past medical history. I completed an independent physical exam and all aspects of medical decision making. I have discussed patient's plan of care with ACP. I agree with note as stated above, having amended the EMR as needed to reflect my findings. This includes HISTORY OF PRESENT ILLNESS, HIV, PAST MEDICAL/SURGICAL/FAMILY/SOCIAL HISTORY, ALLERGIES AND HOME MEDICATIONS, REVIEW OF SYSTEMS, PHYSICAL EXAM, MEDICAL DECISION MAKING and any PROGRESS NOTES during the time I functioned as the attending physician for this patient.  SEE MDM

## 2022-08-08 ENCOUNTER — NON-APPOINTMENT (OUTPATIENT)
Age: 62
End: 2022-08-08

## 2022-08-08 ENCOUNTER — APPOINTMENT (OUTPATIENT)
Dept: PULMONOLOGY | Facility: CLINIC | Age: 62
End: 2022-08-08

## 2022-08-08 VITALS
SYSTOLIC BLOOD PRESSURE: 143 MMHG | TEMPERATURE: 98.1 F | RESPIRATION RATE: 16 BRPM | HEART RATE: 76 BPM | DIASTOLIC BLOOD PRESSURE: 85 MMHG | OXYGEN SATURATION: 100 %

## 2022-08-08 DIAGNOSIS — T67.1XXA HEAT SYNCOPE, INITIAL ENCOUNTER: ICD-10-CM

## 2022-08-08 PROCEDURE — 99214 OFFICE O/P EST MOD 30 MIN: CPT | Mod: 25

## 2022-08-08 PROCEDURE — 36415 COLL VENOUS BLD VENIPUNCTURE: CPT

## 2022-08-08 PROCEDURE — 93000 ELECTROCARDIOGRAM COMPLETE: CPT

## 2022-08-08 RX ORDER — FLUTICASONE PROPIONATE 50 UG/1
50 SPRAY, METERED NASAL
Qty: 1 | Refills: 2 | Status: DISCONTINUED | COMMUNITY
Start: 2021-06-21 | End: 2022-08-08

## 2022-08-08 RX ORDER — COVID-19 ANTIGEN TEST
KIT MISCELLANEOUS
Qty: 8 | Refills: 0 | Status: DISCONTINUED | COMMUNITY
Start: 2022-03-06

## 2022-08-08 RX ORDER — FAMOTIDINE 40 MG/1
40 TABLET, FILM COATED ORAL
Qty: 30 | Refills: 1 | Status: DISCONTINUED | COMMUNITY
Start: 2022-02-24 | End: 2022-08-08

## 2022-08-08 RX ORDER — AZITHROMYCIN 250 MG/1
250 TABLET, FILM COATED ORAL
Qty: 1 | Refills: 0 | Status: DISCONTINUED | COMMUNITY
Start: 2021-06-21 | End: 2022-08-08

## 2022-08-08 NOTE — PROCEDURE
[FreeTextEntry1] : EKG  8/8/2022\par NSR  rate variation RSR\par EKG 2/27/2020\par NSR\par \par Chest x-ray PA lateral projection November 14, 2018\par Mild upper lobe scar noted\par Cardiac size normal\par No pleural effusions parenchymal infiltrates dominant pulmonary nodules\par Mediastinum hilum normal\par \par DEXA Osteopenia f/u Nov 2020\par \par Data review May 29 2020\par CBC normal\par White blood count normalized 5.72 hemoglobin 13.5 hematocrit 42 platelets 262,000\par Vitamin D 34.2\par \par Data review February 28, 2020\par Hemoglobin A1c 5.3%\par Hepatitis C titer negative\par Lipid profile cholesterol 185 HDL 70 \par Triglycerides 47\par TSH 0.28\par Free T4 1.9\par Vitamin D 12.4\par Serum electrolytes normal\par Liver function testing normal\par CBC White blood count 3.41 hemoglobin 13.2 hematocrit 38.8\par Neutrophil 1.79\par Platelet count 245,000\par

## 2022-08-08 NOTE — HISTORY OF PRESENT ILLNESS
[Never] : never [TextBox_4] : Status post emergency department evaluation August 6, 2022\par Patient was at a club\par Had a syncopal episode with loss of consciousness\par 3 hours prior to the incident she did have a wine but apparently did have some vodka mixed with it on\par She ultimately had loss of consciousness slurring of speech confusion became very hot was noted not to be able to generate any sweat\par She was evaluated [facility] subsequently discharged after an extensive work-up\par Stroke work-up was not negative\par She underwent a CT of the head with a CT angiogram of the neck and brain as well with CT brain perfusion map with stroke all negative and noted no evidence for a cord infarct\par Blood work review\par Serum sodium 141 serum potassium reduced 3.2 with low serum calcium 8.3\par BUN 10 creatinine was 0.7\par Glucose 90\par CBC normal\par Liver function testing normal\par WBC 6.69 hemoglobin 13.0 hematocrit 38.5\par Platelets 221,000\par Blood alcohol level 162 mg/dL significantly elevated\par

## 2022-08-08 NOTE — DISCUSSION/SUMMARY
[FreeTextEntry1] : Syncope loss of consciousness slurred speech and negative stroke\par Etiology most likely heat collapse inability to excrete the alcohol as a contributing inciting event on a very hot day\par Less likely to be cardiac or cardiac arrhythmia\par For completeness would see cardiology consider monitoring for completeness check structural integrity of heart and arrhythmia\par COVID 19 Infection 2/24/22\par Vit D  insufficiency\par  Osteopenia\par hypothyroidism-on Synthroid- Change to 6 days per week and recheck next  visit\par Borderline mild [id number] CBC 1 month-subtle viral etiology\par  Complete labs \par weight bearing exercise\par  Ca++/ Vit d\par  As per GYN Mammogram Jan  f/u readvised\par  f/u GI timing colonoscopy post GI Aug 2019\par urine  culture f/u pending\par Advised vitamin D supplementation with 50,000units 1 tablet weekly x8 with a refill\par Recheck CBC\par Nov 2020 DEXA- f/u  next  visit\par Feb 2023\par Jesús roca MD\par  sara roca MD\par \par \par

## 2022-08-09 ENCOUNTER — NON-APPOINTMENT (OUTPATIENT)
Age: 62
End: 2022-08-09

## 2022-08-09 LAB
ALBUMIN SERPL ELPH-MCNC: 4.9 G/DL
ALP BLD-CCNC: 48 U/L
ALT SERPL-CCNC: 12 U/L
ANION GAP SERPL CALC-SCNC: 11 MMOL/L
AST SERPL-CCNC: 11 U/L
BASOPHILS # BLD AUTO: 0.03 K/UL
BASOPHILS NFR BLD AUTO: 0.5 %
BILIRUB SERPL-MCNC: 0.4 MG/DL
BUN SERPL-MCNC: 11 MG/DL
CALCIUM SERPL-MCNC: 9.4 MG/DL
CHLORIDE SERPL-SCNC: 102 MMOL/L
CO2 SERPL-SCNC: 25 MMOL/L
CREAT SERPL-MCNC: 0.67 MG/DL
EGFR: 99 ML/MIN/1.73M2
EOSINOPHIL # BLD AUTO: 0.1 K/UL
EOSINOPHIL NFR BLD AUTO: 1.7 %
GLUCOSE SERPL-MCNC: 99 MG/DL
HCT VFR BLD CALC: 39.9 %
HGB BLD-MCNC: 13.3 G/DL
IMM GRANULOCYTES NFR BLD AUTO: 0.2 %
LYMPHOCYTES # BLD AUTO: 1.6 K/UL
LYMPHOCYTES NFR BLD AUTO: 26.9 %
MAN DIFF?: NORMAL
MCHC RBC-ENTMCNC: 32.7 PG
MCHC RBC-ENTMCNC: 33.3 GM/DL
MCV RBC AUTO: 98 FL
MONOCYTES # BLD AUTO: 0.33 K/UL
MONOCYTES NFR BLD AUTO: 5.6 %
NEUTROPHILS # BLD AUTO: 3.87 K/UL
NEUTROPHILS NFR BLD AUTO: 65.1 %
PLATELET # BLD AUTO: 256 K/UL
POTASSIUM SERPL-SCNC: 4.8 MMOL/L
PROT SERPL-MCNC: 6.9 G/DL
RBC # BLD: 4.07 M/UL
RBC # FLD: 12.3 %
SODIUM SERPL-SCNC: 138 MMOL/L
WBC # FLD AUTO: 5.94 K/UL

## 2022-08-10 ENCOUNTER — NON-APPOINTMENT (OUTPATIENT)
Age: 62
End: 2022-08-10

## 2022-08-11 RX ORDER — LEVOTHYROXINE SODIUM 125 MCG
0 TABLET ORAL
Qty: 0 | Refills: 0 | DISCHARGE

## 2022-10-18 PROBLEM — E03.9 HYPOTHYROIDISM, UNSPECIFIED: Chronic | Status: ACTIVE | Noted: 2022-08-06

## 2022-10-20 ENCOUNTER — APPOINTMENT (OUTPATIENT)
Dept: CARDIOLOGY | Facility: CLINIC | Age: 62
End: 2022-10-20

## 2022-11-05 ENCOUNTER — NON-APPOINTMENT (OUTPATIENT)
Age: 62
End: 2022-11-05

## 2022-11-07 ENCOUNTER — NON-APPOINTMENT (OUTPATIENT)
Age: 62
End: 2022-11-07

## 2022-11-14 ENCOUNTER — TRANSCRIPTION ENCOUNTER (OUTPATIENT)
Age: 62
End: 2022-11-14

## 2022-12-09 ENCOUNTER — NON-APPOINTMENT (OUTPATIENT)
Age: 62
End: 2022-12-09

## 2022-12-09 ENCOUNTER — APPOINTMENT (OUTPATIENT)
Dept: CARDIOLOGY | Facility: CLINIC | Age: 62
End: 2022-12-09

## 2022-12-09 VITALS
HEART RATE: 83 BPM | WEIGHT: 110 LBS | SYSTOLIC BLOOD PRESSURE: 138 MMHG | BODY MASS INDEX: 21.6 KG/M2 | HEIGHT: 60 IN | OXYGEN SATURATION: 100 % | DIASTOLIC BLOOD PRESSURE: 78 MMHG

## 2022-12-09 DIAGNOSIS — Z83.2 FAMILY HISTORY OF DISEASES OF THE BLOOD AND BLOOD-FORMING ORGANS AND CERTAIN DISORDERS INVOLVING THE IMMUNE MECHANISM: ICD-10-CM

## 2022-12-09 PROCEDURE — 99204 OFFICE O/P NEW MOD 45 MIN: CPT

## 2022-12-09 PROCEDURE — 93000 ELECTROCARDIOGRAM COMPLETE: CPT

## 2022-12-13 NOTE — HISTORY OF PRESENT ILLNESS
[FreeTextEntry1] : Patient is a 62 year-old white woman who is referred here for evaluation after an episode of syncope that occurred over the summer of 2022.\porsche Drinks wine regularly, usually two glasses.\porsche Drank on an empty stomach on a hot day. She had one frozE. \par She was warm but not sweating, and told she had heat stroke. \par Markedly elevated blood alcohol. \par \porsche Has a treadmill at home, but does not exercise regularly. She does do a lot of walking.\par \par Palpitations that have been occurring sporadically for years. They occur in clusters where she will have several episodes throughout the day, and then not again for weeks or months. They do not correlate with stress or exercise. \par radha Had Covid-19 infection in Februrary 2022. She has received the initial vaccines, but she does not want further vaccines after her son had a reaction to one of them.

## 2022-12-13 NOTE — DISCUSSION/SUMMARY
[EKG obtained to assist in diagnosis and management of assessed problem(s)] : EKG obtained to assist in diagnosis and management of assessed problem(s) [FreeTextEntry1] : Patient is a 62 year-old woman with history as above who presents with an episode of syncope in the setting of EtOH, poor PO intake, and a hot day.\par \par Discussed [employer] for monitoring for infrequent palpitations.

## 2022-12-14 ENCOUNTER — RX RENEWAL (OUTPATIENT)
Age: 62
End: 2022-12-14

## 2023-01-19 ENCOUNTER — APPOINTMENT (OUTPATIENT)
Dept: CARDIOLOGY | Facility: CLINIC | Age: 63
End: 2023-01-19

## 2023-03-02 ENCOUNTER — APPOINTMENT (OUTPATIENT)
Dept: PULMONOLOGY | Facility: CLINIC | Age: 63
End: 2023-03-02
Payer: COMMERCIAL

## 2023-03-02 VITALS — HEART RATE: 88 BPM | DIASTOLIC BLOOD PRESSURE: 75 MMHG | SYSTOLIC BLOOD PRESSURE: 118 MMHG | OXYGEN SATURATION: 97 %

## 2023-03-02 DIAGNOSIS — M26.649 ARTHRITIS OF UNSPECIFIED TEMPOROMANDIBULAR JOINT: ICD-10-CM

## 2023-03-02 PROCEDURE — 99213 OFFICE O/P EST LOW 20 MIN: CPT

## 2023-03-02 NOTE — PHYSICAL EXAM
[No Acute Distress] : no acute distress [Normal Oropharynx] : normal oropharynx [Normal Appearance] : normal appearance [Supple] : supple [No JVD] : no jvd [Normal Rate/Rhythm] : normal rate/rhythm [Normal S1, S2] : normal s1, s2 [No Murmurs] : no murmurs [No Resp Distress] : no resp distress [Clear to Auscultation Bilaterally] : clear to auscultation bilaterally [No Abnormalities] : no abnormalities [Benign] : benign [Normal Gait] : normal gait [No Clubbing] : no clubbing [No Cyanosis] : no cyanosis [No Edema] : no edema [FROM] : FROM [Normal Color/ Pigmentation] : normal color/ pigmentation [No Focal Deficits] : no focal deficits [Oriented x3] : oriented x3 [Normal Affect] : normal affect [TextBox_11] : no TMJ click, TM nl

## 2023-03-14 ENCOUNTER — APPOINTMENT (OUTPATIENT)
Dept: GASTROENTEROLOGY | Facility: CLINIC | Age: 63
End: 2023-03-14
Payer: COMMERCIAL

## 2023-03-14 VITALS
OXYGEN SATURATION: 98 % | BODY MASS INDEX: 21.6 KG/M2 | WEIGHT: 110 LBS | SYSTOLIC BLOOD PRESSURE: 122 MMHG | TEMPERATURE: 98.1 F | DIASTOLIC BLOOD PRESSURE: 80 MMHG | HEIGHT: 60 IN | HEART RATE: 71 BPM

## 2023-03-14 DIAGNOSIS — Z12.11 ENCOUNTER FOR SCREENING FOR MALIGNANT NEOPLASM OF COLON: ICD-10-CM

## 2023-03-14 PROCEDURE — 99203 OFFICE O/P NEW LOW 30 MIN: CPT

## 2023-03-14 RX ORDER — SODIUM PICOSULFATE, MAGNESIUM OXIDE, AND ANHYDROUS CITRIC ACID 10; 3.5; 12 MG/160ML; G/160ML; G/160ML
10-3.5-12 MG-GM LIQUID ORAL
Qty: 1 | Refills: 0 | Status: ACTIVE | COMMUNITY
Start: 2023-03-14 | End: 1900-01-01

## 2023-03-14 NOTE — PHYSICAL EXAM
[Alert] : alert [Normal Voice/Communication] : normal voice/communication [Healthy Appearing] : healthy appearing [No Acute Distress] : no acute distress [Sclera] : the sclera and conjunctiva were normal [Hearing Threshold Finger Rub Not [place]] : hearing was normal [Normal Lips/Gums] : the lips and gums were normal [Oropharynx] : the oropharynx was normal [Normal Appearance] : the appearance of the neck was normal [No Neck Mass] : no neck mass was observed [No Respiratory Distress] : no respiratory distress [No Acc Muscle Use] : no accessory muscle use [Respiration, Rhythm And Depth] : normal respiratory rhythm and effort [Auscultation Breath Sounds / Voice Sounds] : lungs were clear to auscultation bilaterally [Heart Rate And Rhythm] : heart rate was normal and rhythm regular [Normal S1, S2] : normal S1 and S2 [Murmurs] : no murmurs [None] : no edema [Bowel Sounds] : normal bowel sounds [Abdomen Tenderness] : non-tender [No Masses] : no abdominal mass palpated [Abdomen Soft] : soft [] : no hepatosplenomegaly [Cervical Lymph Nodes Enlarged Posterior Bilaterally] : no posterior cervical lymphadenopathy [Supraclavicular Lymph Nodes Enlarged Bilaterally] : no supraclavicular lymphadenopathy [No CVA Tenderness] : no CVA  tenderness [Abnormal Walk] : normal gait [Cranial Nerves Intact] : cranial nerves 2-12 were intact [Motor Exam] : the motor exam was normal [Normal] : oriented to person, place, and time [Oriented To Time, Place, And Person] : oriented to person, place, and time [Normal Mood] : the mood was normal

## 2023-03-14 NOTE — HISTORY OF PRESENT ILLNESS
[FreeTextEntry1] : 62-year-old woman referred for a screening colonoscopy.  Her last colonoscopy was approximately 10 years ago.  She denies rectal bleeding, melena or hematemesis.  She currently takes thyroid supplement but is otherwise in good health.

## 2023-04-11 ENCOUNTER — APPOINTMENT (OUTPATIENT)
Dept: PULMONOLOGY | Facility: CLINIC | Age: 63
End: 2023-04-11
Payer: COMMERCIAL

## 2023-04-11 DIAGNOSIS — J01.90 ACUTE SINUSITIS, UNSPECIFIED: ICD-10-CM

## 2023-04-11 PROCEDURE — 99213 OFFICE O/P EST LOW 20 MIN: CPT | Mod: 95

## 2023-04-11 RX ORDER — CEFUROXIME AXETIL 500 MG/1
500 TABLET ORAL
Qty: 14 | Refills: 0 | Status: ACTIVE | COMMUNITY
Start: 2023-04-11 | End: 1900-01-01

## 2023-04-11 NOTE — HISTORY OF PRESENT ILLNESS
[Home] : at home, [unfilled] , at the time of the visit. [Medical Office: ([facility])___] : at the medical office located in  [Verbal consent obtained from patient] : the patient, [unfilled] [Never] : never [TextBox_4] :  chief complaint sinus pressure pain fullness some component of headache\par Positive cough\par Initially thought it may be allergy related but now feels like she does have an active sinus infection\par No active respiratory symptoms\par No fever or chills\par

## 2023-04-11 NOTE — REVIEW OF SYSTEMS
[Fatigue] : fatigue [Nasal Congestion] : nasal congestion [Postnasal Drip] : postnasal drip [Cough] : cough [Negative] : Genitourinary [Fever] : no fever [Chills] : no chills [Sputum] : no sputum [Dyspnea] : no dyspnea [Wheezing] : no wheezing [SOB on Exertion] : no sob on exertion

## 2023-04-11 NOTE — DISCUSSION/SUMMARY
[FreeTextEntry1] : Sinusitis\par Continue Flonase\par Advised it is okay to use Mucinex DM\par Ceftin 500 mg 1Tablet p.o. twice daily x7 days\par Office visit if no interval improvement

## 2023-05-08 ENCOUNTER — APPOINTMENT (OUTPATIENT)
Dept: CARDIOLOGY | Facility: CLINIC | Age: 63
End: 2023-05-08
Payer: COMMERCIAL

## 2023-05-08 ENCOUNTER — NON-APPOINTMENT (OUTPATIENT)
Age: 63
End: 2023-05-08

## 2023-05-08 VITALS
SYSTOLIC BLOOD PRESSURE: 137 MMHG | OXYGEN SATURATION: 98 % | BODY MASS INDEX: 22.46 KG/M2 | WEIGHT: 115 LBS | HEART RATE: 112 BPM | DIASTOLIC BLOOD PRESSURE: 86 MMHG

## 2023-05-08 DIAGNOSIS — E03.9 HYPOTHYROIDISM, UNSPECIFIED: ICD-10-CM

## 2023-05-08 PROCEDURE — 93000 ELECTROCARDIOGRAM COMPLETE: CPT

## 2023-05-08 PROCEDURE — 99214 OFFICE O/P EST MOD 30 MIN: CPT

## 2023-05-08 NOTE — DISCUSSION/SUMMARY
[EKG obtained to assist in diagnosis and management of assessed problem(s)] : EKG obtained to assist in diagnosis and management of assessed problem(s) [FreeTextEntry1] : Patient is a 62 year-old woman with history as above who presents with an episode of syncope in the setting of EtOH, poor PO intake, and a hot day.\par \par Discussed [employer] for monitoring for infrequent palpitations.\par The patient was strongly advised to avoid decongestants, and if possible avoid alcohol and caffeine.  We will send her for an echocardiogram for chamber size and LV function and for a treadmill stress test.  No new medications were started at this visit.

## 2023-05-22 ENCOUNTER — APPOINTMENT (OUTPATIENT)
Dept: CARDIOLOGY | Facility: CLINIC | Age: 63
End: 2023-05-22
Payer: COMMERCIAL

## 2023-05-22 ENCOUNTER — NON-APPOINTMENT (OUTPATIENT)
Age: 63
End: 2023-05-22

## 2023-05-22 VITALS
OXYGEN SATURATION: 100 % | HEIGHT: 60 IN | SYSTOLIC BLOOD PRESSURE: 125 MMHG | BODY MASS INDEX: 22.58 KG/M2 | WEIGHT: 115 LBS | RESPIRATION RATE: 17 BRPM | HEART RATE: 60 BPM | DIASTOLIC BLOOD PRESSURE: 76 MMHG

## 2023-05-22 DIAGNOSIS — R00.2 PALPITATIONS: ICD-10-CM

## 2023-05-22 DIAGNOSIS — I47.1 SUPRAVENTRICULAR TACHYCARDIA: ICD-10-CM

## 2023-05-22 DIAGNOSIS — R55 SYNCOPE AND COLLAPSE: ICD-10-CM

## 2023-05-22 PROCEDURE — 93000 ELECTROCARDIOGRAM COMPLETE: CPT

## 2023-05-22 PROCEDURE — 99214 OFFICE O/P EST MOD 30 MIN: CPT

## 2023-05-23 PROBLEM — R55 SYNCOPE AND COLLAPSE: Status: ACTIVE | Noted: 2022-08-08

## 2023-05-23 PROBLEM — R00.2 PALPITATIONS: Status: ACTIVE | Noted: 2023-05-08

## 2023-06-03 NOTE — DISCUSSION/SUMMARY
[EKG obtained to assist in diagnosis and management of assessed problem(s)] : EKG obtained to assist in diagnosis and management of assessed problem(s) [FreeTextEntry1] : Patient is a 63 year-old woman with history as above who presents with palpitations occurring in clusters. \par \par Previously she had  an episode of syncope in the setting of ETOH, poor PO intake, and a hot day.\par \par [employer] tracings reviewed, sinus tachycardia up to 140's seen on several days since 5/10/23.\par The patient was strongly advised to avoid decongestants, and if possible avoid alcohol and caffeine. \par We will send her for an echocardiogram for chamber size and LV function and for a treadmill stress test. These exams are scheduled for June.\par \par Follow up pending results.

## 2023-06-03 NOTE — REASON FOR VISIT
[Symptom and Test Evaluation] : symptom and test evaluation [FreeTextEntry1] : 5/22/2023\par Chayito Hughes returns today for evaluation of palpitations which occur in clusters on and off throughout the day. The palpitations are similar to what she has experienced in the past. Previously she was advised to avoid caffeine and alcohol and she has been experimenting in trying to identify any triggers without success. She denies any chest discomfort, shortness of breath or lightheadedness.  There have been no changes to her medications and she is taking all as directed. \par

## 2023-06-03 NOTE — HISTORY OF PRESENT ILLNESS
[FreeTextEntry1] : Patient is a 62 year-old white woman who is referred here for evaluation after an episode of syncope that occurred over the summer of 2022.\porsche Drinks wine regularly, usually two glasses.\par Drank on an empty stomach on a hot day. She had one frozE. \par She was warm but not sweating, and told she had heat stroke. \par Markedly elevated blood alcohol. \par \porsche Has a treadmill at home, but does not exercise regularly. She does do a lot of walking.\par \par Palpitations that have been occurring sporadically for years. They occur in clusters where she will have several episodes throughout the day, and then not again for weeks or months. They do not correlate with stress or exercise. \par \porsche Had Covid-19 infection in Februrary 2022. She has received the initial vaccines, but she does not want further vaccines after her son had a reaction to one of them.\par \par May  8 ,2023: The patient's had intermittent episodes of fast heartbeats.  Sometimes they go up to a rate of 150.  She does have 1 cup of caffeinated coffee a day.  She will have 1 or 2 glasses of wine when going out all weekend.  She recently has been taking Claritin and she is not sure whether it is Claritin or Claritin-D  She had never gone for the stress test.

## 2023-06-03 NOTE — END OF VISIT
[Time Spent: ___ minutes] : I have spent [unfilled] minutes of time on the encounter. [FreeTextEntry3] : I saw the patient with Neetu Aguilar NP and I agree with the findings and plan as above.

## 2023-06-13 ENCOUNTER — APPOINTMENT (OUTPATIENT)
Dept: CARDIOLOGY | Facility: CLINIC | Age: 63
End: 2023-06-13
Payer: COMMERCIAL

## 2023-06-13 PROCEDURE — 93306 TTE W/DOPPLER COMPLETE: CPT

## 2023-06-13 PROCEDURE — 93015 CV STRESS TEST SUPVJ I&R: CPT

## 2023-07-17 ENCOUNTER — RX RENEWAL (OUTPATIENT)
Age: 63
End: 2023-07-17

## 2023-08-07 ENCOUNTER — APPOINTMENT (OUTPATIENT)
Dept: PULMONOLOGY | Facility: CLINIC | Age: 63
End: 2023-08-07
Payer: COMMERCIAL

## 2023-08-07 VITALS — OXYGEN SATURATION: 97 % | DIASTOLIC BLOOD PRESSURE: 85 MMHG | HEART RATE: 72 BPM | SYSTOLIC BLOOD PRESSURE: 153 MMHG

## 2023-08-07 LAB — POCT - HEMOGLOBIN (HGB), QUANTITATIVE, TRANSCUTANEOUS: 13.9

## 2023-08-07 PROCEDURE — 94727 GAS DIL/WSHOT DETER LNG VOL: CPT

## 2023-08-07 PROCEDURE — 88738 HGB QUANT TRANSCUTANEOUS: CPT

## 2023-08-07 PROCEDURE — 94060 EVALUATION OF WHEEZING: CPT

## 2023-08-07 PROCEDURE — ZZZZZ: CPT

## 2023-08-07 PROCEDURE — 99214 OFFICE O/P EST MOD 30 MIN: CPT | Mod: 25

## 2023-08-07 PROCEDURE — 94729 DIFFUSING CAPACITY: CPT

## 2023-08-07 RX ORDER — AZITHROMYCIN 250 MG/1
250 TABLET, FILM COATED ORAL
Qty: 1 | Refills: 0 | Status: ACTIVE | COMMUNITY
Start: 2023-08-07 | End: 1900-01-01

## 2023-08-07 NOTE — PROCEDURE
[FreeTextEntry1] : PFT 8/7/23 Sai nl  No BD at FEV1  Lung volumes nl  DLCO 85 % HGB 13.9  Chest x-ray PA lateral 8/27/23 Indication cough Cardiac size normal Lung fields are clear No parenchymal infiltrates pleural effusions or dominant pulmonary nodules Soft tissue bony structures unremarkable Mediastinum unremarkable Impression clear lungs  EKG 2/27/2020 NSR  Chest x-ray PA lateral projection November 14, 2018 Mild upper lobe scar noted Cardiac size normal No pleural effusions parenchymal infiltrates dominant pulmonary nodules Mediastinum hilum normal  DEXA Osteopenia f/u Nov 2020  Data review May 29 2020 CBC normal White blood count normalized 5.72 hemoglobin 13.5 hematocrit 42 platelets 262,000 Vitamin D 34.2  Data review February 28, 2020 Hemoglobin A1c 5.3% Hepatitis C titer negative Lipid profile cholesterol 185 HDL 70  Triglycerides 47 TSH 0.28 Free T4 1.9 Vitamin D 12.4 Serum electrolytes normal Liver function testing normal CBC White blood count 3.41 hemoglobin 13.2 hematocrit 38.8 Neutrophil 1.79 Platelet count 245,000

## 2023-08-07 NOTE — HISTORY OF PRESENT ILLNESS
[Never] : never [TextBox_4] :  chief complaint sinus pressure pain fullness some component of headache\par  Positive cough\par  Initially thought it may be allergy related but now feels like she does have an active sinus infection\par  No active respiratory symptoms\par  No fever or chills\par

## 2023-08-09 ENCOUNTER — TRANSCRIPTION ENCOUNTER (OUTPATIENT)
Age: 63
End: 2023-08-09

## 2023-08-09 RX ORDER — BENZONATATE 100 MG/1
100 CAPSULE ORAL
Qty: 30 | Refills: 2 | Status: ACTIVE | COMMUNITY
Start: 2023-08-09 | End: 1900-01-01

## 2023-12-06 ENCOUNTER — NON-APPOINTMENT (OUTPATIENT)
Age: 63
End: 2023-12-06

## 2024-01-25 ENCOUNTER — RX RENEWAL (OUTPATIENT)
Age: 64
End: 2024-01-25

## 2024-01-25 RX ORDER — LEVOTHYROXINE SODIUM 0.07 MG/1
75 TABLET ORAL
Qty: 90 | Refills: 1 | Status: ACTIVE | COMMUNITY
Start: 2018-08-27 | End: 1900-01-01

## 2024-01-29 ENCOUNTER — APPOINTMENT (OUTPATIENT)
Dept: GASTROENTEROLOGY | Facility: AMBULATORY MEDICAL SERVICES | Age: 64
End: 2024-01-29
Payer: COMMERCIAL

## 2024-01-29 PROCEDURE — 45378 DIAGNOSTIC COLONOSCOPY: CPT | Mod: 33

## 2024-02-29 ENCOUNTER — APPOINTMENT (OUTPATIENT)
Dept: PULMONOLOGY | Facility: CLINIC | Age: 64
End: 2024-02-29
Payer: COMMERCIAL

## 2024-02-29 VITALS — OXYGEN SATURATION: 95 % | DIASTOLIC BLOOD PRESSURE: 69 MMHG | SYSTOLIC BLOOD PRESSURE: 103 MMHG | HEART RATE: 76 BPM

## 2024-02-29 DIAGNOSIS — R09.89 OTHER SPECIFIED SYMPTOMS AND SIGNS INVOLVING THE CIRCULATORY AND RESPIRATORY SYSTEMS: ICD-10-CM

## 2024-02-29 DIAGNOSIS — R05.9 COUGH, UNSPECIFIED: ICD-10-CM

## 2024-02-29 LAB
FLUAV SPEC QL CULT: NEGATIVE
S PYO AG SPEC QL IA: NEGATIVE
SARS-COV-2 AG RESP QL IA.RAPID: NEGATIVE

## 2024-02-29 PROCEDURE — 99214 OFFICE O/P EST MOD 30 MIN: CPT

## 2024-02-29 PROCEDURE — 87880 STREP A ASSAY W/OPTIC: CPT | Mod: QW

## 2024-02-29 PROCEDURE — 87811 SARS-COV-2 COVID19 W/OPTIC: CPT | Mod: QW

## 2024-02-29 PROCEDURE — 71045 X-RAY EXAM CHEST 1 VIEW: CPT

## 2024-02-29 PROCEDURE — 87804 INFLUENZA ASSAY W/OPTIC: CPT | Mod: QW

## 2024-02-29 NOTE — DISCUSSION/SUMMARY
[FreeTextEntry1] : Post viral bronchitis  nasal rinse Z Dorian discussed but only if fever or suggestion of an active sinus infection  Mucinex  DM hold off  steroids

## 2024-02-29 NOTE — PROCEDURE
[FreeTextEntry1] : Chest x-ray 1 view PA and February 29, 2024 Cardiac size is normal Lung fields are clear No parenchymal or pleural effusions or dominant pulmonary nodules   PFT 8/7/23 Sai nl  No BD at FEV1  Lung volumes nl  DLCO 85 % HGB 13.9  Chest x-ray PA lateral 8/27/23 Indication cough Cardiac size normal Lung fields are clear No parenchymal infiltrates pleural effusions or dominant pulmonary nodules Soft tissue bony structures unremarkable Mediastinum unremarkable Impression clear lungs  EKG 2/27/2020 NSR  Chest x-ray PA lateral projection November 14, 2018 Mild upper lobe scar noted Cardiac size normal No pleural effusions parenchymal infiltrates dominant pulmonary nodules Mediastinum hilum normal  DEXA Osteopenia f/u Nov 2020  Data review May 29 2020 CBC normal White blood count normalized 5.72 hemoglobin 13.5 hematocrit 42 platelets 262,000 Vitamin D 34.2  Data review February 28, 2020 Hemoglobin A1c 5.3% Hepatitis C titer negative Lipid profile cholesterol 185 HDL 70  Triglycerides 47 TSH 0.28 Free T4 1.9 Vitamin D 12.4 Serum electrolytes normal Liver function testing normal CBC White blood count 3.41 hemoglobin 13.2 hematocrit 38.8 Neutrophil 1.79 Platelet count 245,000

## 2024-03-01 LAB
INFLUENZA A RESULT: NOT DETECTED
INFLUENZA B RESULT: NOT DETECTED
RESP SYN VIRUS RESULT: NOT DETECTED
SARS-COV-2 RESULT: NOT DETECTED

## 2024-03-05 ENCOUNTER — TRANSCRIPTION ENCOUNTER (OUTPATIENT)
Age: 64
End: 2024-03-05

## 2024-03-05 RX ORDER — METHYLPREDNISOLONE 4 MG/1
4 TABLET ORAL
Qty: 1 | Refills: 0 | Status: ACTIVE | COMMUNITY
Start: 2024-03-05 | End: 1900-01-01

## 2024-07-29 ENCOUNTER — APPOINTMENT (OUTPATIENT)
Dept: PULMONOLOGY | Facility: CLINIC | Age: 64
End: 2024-07-29
Payer: COMMERCIAL

## 2024-07-29 ENCOUNTER — NON-APPOINTMENT (OUTPATIENT)
Age: 64
End: 2024-07-29

## 2024-07-29 VITALS
BODY MASS INDEX: 22.19 KG/M2 | HEIGHT: 60 IN | HEART RATE: 63 BPM | OXYGEN SATURATION: 100 % | DIASTOLIC BLOOD PRESSURE: 81 MMHG | WEIGHT: 113 LBS | SYSTOLIC BLOOD PRESSURE: 148 MMHG

## 2024-07-29 DIAGNOSIS — Z00.00 ENCOUNTER FOR GENERAL ADULT MEDICAL EXAMINATION W/OUT ABNORMAL FINDINGS: ICD-10-CM

## 2024-07-29 DIAGNOSIS — E55.9 VITAMIN D DEFICIENCY, UNSPECIFIED: ICD-10-CM

## 2024-07-29 DIAGNOSIS — M85.80 OTHER SPECIFIED DISORDERS OF BONE DENSITY AND STRUCTURE, UNSPECIFIED SITE: ICD-10-CM

## 2024-07-29 LAB
ALBUMIN: 10
BASOPHILS # BLD AUTO: 0.03 K/UL
BASOPHILS NFR BLD AUTO: 0.7 %
BILIRUB UR QL STRIP: NEGATIVE
CLARITY UR: CLEAR
CREATININE: 10
EOSINOPHIL # BLD AUTO: 0.08 K/UL
EOSINOPHIL NFR BLD AUTO: 1.9 %
GLUCOSE UR-MCNC: NEGATIVE
HCG UR QL: 0.2 EU/DL
HCT VFR BLD CALC: 40.2 %
HGB BLD-MCNC: 12.6 G/DL
HGB UR QL STRIP.AUTO: NORMAL
IMM GRANULOCYTES NFR BLD AUTO: 0.2 %
KETONES UR-MCNC: NEGATIVE
LEUKOCYTE ESTERASE UR QL STRIP: NEGATIVE
LYMPHOCYTES # BLD AUTO: 1.27 K/UL
LYMPHOCYTES NFR BLD AUTO: 29.5 %
MAN DIFF?: NORMAL
MCHC RBC-ENTMCNC: 30.8 PG
MCHC RBC-ENTMCNC: 31.3 GM/DL
MCV RBC AUTO: 98.3 FL
MICROALBUMIN/CREAT UR TEST STR-RTO: <30
MONOCYTES # BLD AUTO: 0.26 K/UL
MONOCYTES NFR BLD AUTO: 6 %
NEUTROPHILS # BLD AUTO: 2.65 K/UL
NEUTROPHILS NFR BLD AUTO: 61.7 %
NITRITE UR QL STRIP: NEGATIVE
PH UR STRIP: 7
PLATELET # BLD AUTO: 262 K/UL
PROT UR STRIP-MCNC: NEGATIVE
RBC # BLD: 4.09 M/UL
RBC # FLD: 12.6 %
SP GR UR STRIP: 1.02
WBC # FLD AUTO: 4.3 K/UL

## 2024-07-29 PROCEDURE — 99396 PREV VISIT EST AGE 40-64: CPT

## 2024-07-29 PROCEDURE — 82044 UR ALBUMIN SEMIQUANTITATIVE: CPT | Mod: QW

## 2024-07-29 PROCEDURE — 81003 URINALYSIS AUTO W/O SCOPE: CPT | Mod: QW

## 2024-07-29 PROCEDURE — 36415 COLL VENOUS BLD VENIPUNCTURE: CPT

## 2024-07-29 PROCEDURE — 77085 DXA BONE DENSITY AXL VRT FX: CPT

## 2024-07-29 PROCEDURE — 93000 ELECTROCARDIOGRAM COMPLETE: CPT

## 2024-07-29 NOTE — HISTORY OF PRESENT ILLNESS
[Never] : never [TextBox_4] : CPE Past medical history Hypothyroidism Osteopenia History of palpitations not active History of paroxysmal supraventricular tachycardia Vitamin D insufficiency Feels that she is under significant stressors with a son getting  family members with illness and chronic disease cancer No other active complaints States GYN breast mammogram up-to-date Colonoscopy up-to-date

## 2024-07-29 NOTE — PROCEDURE
[FreeTextEntry1] : Blood draw Bone density DEXA scan July 29, 2024 AP spine L1-L4 osteopenia Left femoral neck osteopenia Total left hip osteopenia EKG July 29, 2024 Normal sinus rhythm RSR  Chest x-ray 1 view PA and February 29, 2024 Cardiac size is normal Lung fields are clear No parenchymal or pleural effusions or dominant pulmonary nodules  PFT 8/7/23 Sai nl  No BD at FEV1  Lung volumes nl  DLCO 85 % HGB 13.9  Chest x-ray PA lateral 8/27/23 Indication cough Cardiac size normal Lung fields are clear No parenchymal infiltrates pleural effusions or dominant pulmonary nodules Soft tissue bony structures unremarkable Mediastinum unremarkable Impression clear lungs  EKG 2/27/2020 NSR  Chest x-ray PA lateral projection November 14, 2018 Mild upper lobe scar noted Cardiac size normal No pleural effusions parenchymal infiltrates dominant pulmonary nodules Mediastinum hilum normal  DEXA Osteopenia f/u Nov 2020  Data review May 29 2020 CBC normal White blood count normalized 5.72 hemoglobin 13.5 hematocrit 42 platelets 262,000 Vitamin D 34.2  Data review February 28, 2020 Hemoglobin A1c 5.3% Hepatitis C titer negative Lipid profile cholesterol 185 HDL 70  Triglycerides 47 TSH 0.28 Free T4 1.9 Vitamin D 12.4 Serum electrolytes normal Liver function testing normal CBC White blood count 3.41 hemoglobin 13.2 hematocrit 38.8 Neutrophil 1.79 Platelet count 245,000

## 2024-07-29 NOTE — DISCUSSION/SUMMARY
[FreeTextEntry1] : Complete well exam Osteopenia Blood work pending Discussed with patient stress anxiety Explained to her in detail her emotions are normal

## 2024-07-30 ENCOUNTER — NON-APPOINTMENT (OUTPATIENT)
Age: 64
End: 2024-07-30

## 2024-07-30 LAB
25(OH)D3 SERPL-MCNC: 28.4 NG/ML
ALBUMIN SERPL ELPH-MCNC: 4.9 G/DL
ALP BLD-CCNC: 51 U/L
ALT SERPL-CCNC: 12 U/L
ANION GAP SERPL CALC-SCNC: 12 MMOL/L
AST SERPL-CCNC: 15 U/L
BILIRUB DIRECT SERPL-MCNC: 0.2 MG/DL
BILIRUB INDIRECT SERPL-MCNC: 0.4 MG/DL
BILIRUB SERPL-MCNC: 0.5 MG/DL
BUN SERPL-MCNC: 9 MG/DL
CALCIUM SERPL-MCNC: 9.7 MG/DL
CHLORIDE SERPL-SCNC: 103 MMOL/L
CHOLEST SERPL-MCNC: 206 MG/DL
CO2 SERPL-SCNC: 27 MMOL/L
CREAT SERPL-MCNC: 0.66 MG/DL
EGFR: 99 ML/MIN/1.73M2
ESTIMATED AVERAGE GLUCOSE: 108 MG/DL
GLUCOSE SERPL-MCNC: 86 MG/DL
HBA1C MFR BLD HPLC: 5.4 %
HDLC SERPL-MCNC: 76 MG/DL
LDLC SERPL CALC-MCNC: 120 MG/DL
NONHDLC SERPL-MCNC: 130 MG/DL
POTASSIUM SERPL-SCNC: 4.5 MMOL/L
PROT SERPL-MCNC: 7.3 G/DL
SODIUM SERPL-SCNC: 142 MMOL/L
T4 FREE SERPL-MCNC: 1.5 NG/DL
T4 SERPL-MCNC: 8.3 UG/DL
TRIGL SERPL-MCNC: 52 MG/DL
TSH SERPL-ACNC: 1.98 UIU/ML

## 2024-08-15 ENCOUNTER — RX RENEWAL (OUTPATIENT)
Age: 64
End: 2024-08-15

## 2024-10-28 ENCOUNTER — NON-APPOINTMENT (OUTPATIENT)
Age: 64
End: 2024-10-28

## 2024-10-28 ENCOUNTER — APPOINTMENT (OUTPATIENT)
Dept: PULMONOLOGY | Facility: CLINIC | Age: 64
End: 2024-10-28
Payer: COMMERCIAL

## 2024-10-28 VITALS — SYSTOLIC BLOOD PRESSURE: 108 MMHG | DIASTOLIC BLOOD PRESSURE: 70 MMHG | OXYGEN SATURATION: 100 % | HEART RATE: 64 BPM

## 2024-10-28 DIAGNOSIS — E78.00 PURE HYPERCHOLESTEROLEMIA, UNSPECIFIED: ICD-10-CM

## 2024-10-28 DIAGNOSIS — E03.9 HYPOTHYROIDISM, UNSPECIFIED: ICD-10-CM

## 2024-10-28 LAB — TSH SERPL-ACNC: 1.64 UIU/ML

## 2024-10-28 PROCEDURE — 36415 COLL VENOUS BLD VENIPUNCTURE: CPT

## 2024-10-28 PROCEDURE — G0008: CPT

## 2024-10-28 PROCEDURE — 99213 OFFICE O/P EST LOW 20 MIN: CPT | Mod: 25

## 2024-10-28 PROCEDURE — 90656 IIV3 VACC NO PRSV 0.5 ML IM: CPT

## 2024-10-29 ENCOUNTER — NON-APPOINTMENT (OUTPATIENT)
Age: 64
End: 2024-10-29

## 2024-10-29 LAB
CHOLEST SERPL-MCNC: 196 MG/DL
HDLC SERPL-MCNC: 73 MG/DL
LDLC SERPL CALC-MCNC: 112 MG/DL
NONHDLC SERPL-MCNC: 122 MG/DL
TRIGL SERPL-MCNC: 53 MG/DL